# Patient Record
Sex: FEMALE | Race: BLACK OR AFRICAN AMERICAN | Employment: OTHER | URBAN - METROPOLITAN AREA
[De-identification: names, ages, dates, MRNs, and addresses within clinical notes are randomized per-mention and may not be internally consistent; named-entity substitution may affect disease eponyms.]

---

## 2017-06-15 ENCOUNTER — HOSPITAL ENCOUNTER (OUTPATIENT)
Dept: PHYSICAL THERAPY | Age: 72
Discharge: HOME | End: 2017-06-15
Payer: MEDICARE

## 2017-06-15 PROCEDURE — 97161 PT EVAL LOW COMPLEX 20 MIN: CPT

## 2017-06-15 PROCEDURE — G8988 SELF CARE GOAL STATUS: HCPCS

## 2017-06-15 PROCEDURE — G8987 SELF CARE CURRENT STATUS: HCPCS

## 2017-06-15 PROCEDURE — 97110 THERAPEUTIC EXERCISES: CPT

## 2017-06-15 NOTE — PROGRESS NOTES
PT DAILY TREATMENT NOTE - [facility]     Patient Name: Zi Baer  Date:6/15/2017  : 1945  [x]  Patient  Verified  Payor: Susanne Ee / Plan: [place] MED[place] & B / Product Type: Medicare /    In time:9:13  Out time:10:00  Total Treatment Time (min): 47  Total Timed Codes (min): 8  1:1 Treatment Time ( [address] only): 52   Visit #: 1 of 16    Treatment Area: Pain in shoulder region, left [M25.512]    SUBJECTIVE  Pain Level (0-10 scale): 0/10  Any medication changes, allergies to medications, adverse drug reactions, diagnosis change, or new procedure performed?: [x] No    [] Yes (see summary sheet for update)  Subjective functional status/changes:   [] No changes reported  Ceil Stakes in late 2017 hurting B shoulders (left> right)  Hx: left rotator cuff repair , and another time prior to   Job: retired, lives with , right handed  Goals: \"I would love to use my left arm more. \"  C/o: difficulty with pain when elevating left UE over shoulder level, denies dropping items in hand, can use UEs at shoulder level    OBJECTIVE    Modality rationale:    Min Type Additional Details    [] Estim:  []Unatt       []IFC  []Premod                        []Other:  []w/ice   []w/heat  Position:  Location:    [] Estim: []Att    []TENS instruct  []NMES                    []Other:  []w/US   []w/ice   []w/heat  Position:  Location:    []  Traction: [] Cervical       []Lumbar                       [] Prone          []Supine                       []Intermittent   []Continuous Lbs:  [] before manual  [] after manual    []  Ultrasound: []Continuous   [] Pulsed                           []1MHz   []3MHz W/cm2:  Location:    []  Iontophoresis with dexamethasone         Location: [] Take home patch   [] In clinic    []  Ice     []  heat  []  Ice massage  []  Laser   []  Anodyne Position:  Location:    []  Laser with stim  []  Other:  Position:  Location:    []  Vasopneumatic Device Pressure:       [] lo [] med [] hi Temperature: [] lo [] med [] hi   [] Skin assessment post-treatment:  []intact []redness- no adverse reaction    []redness - adverse reaction:     39 min [x]Eval                  []Re-Eval       8 min Therapeutic Exercise:  [x] See flow sheet :  Added shoulder flexion stretches at counter   Rationale: increase ROM to improve the patients ability to reach overhead. min Therapeutic Activity:  []  See flow sheet :         min Neuromuscular Re-education:  []  See flow sheet :        min Manual Therapy:          min Gait Training:  ___ feet with ___ device on level surfaces with ___ level of assist   Rationale: With   [] TE   [] TA   [] neuro   [] other: Patient Education: [x] Review HEP    [] Progressed/Changed HEP based on:   [] positioning   [] body mechanics   [] transfers   [] heat/ice application    [] other:      Other Objective/Functional Measures:   See evaluation and plan of care. Pain Level (0-10 scale) post treatment: 0/10    ASSESSMENT/Changes in Function:    Pt reports that MRI shows rotator cuff tear, but she would like to try PT to improve strength and function and avoid surgery if possible. During PT exam, pt's AROM of shoulder flexion: 126 degrees left, 141 degrees right, shoulder extension: 56 degrees left, 57 degrees right, scaption: 138 degrees left, 141 degrees right, abduction: 100 degrees left, 139 degrees right, ER: crown of head left, C2 level right, IR: L1 level left, iliac crest right; PROM of left shoulder flexion: 152 degrees left, scaption: 180 degrees, abduction: 169 degrees, ER: 90 degrees, IR: 57 degrees; strength of UEs grossly 4/5 to 5/5 bilateral UEs with pain during resisted shoulder flexion bilaterally, abduction bilaterally, and IR on left only; trigger points left neck and C/T musculature; negative impingement signs, shoulder instability , and ACJ tenderness to palpation.     Patient will continue to benefit from skilled PT services to modify and progress therapeutic interventions, address ROM deficits, address strength deficits, analyze and address soft tissue restrictions, analyze and cue movement patterns, analyze and modify body mechanics/ergonomics, assess and modify postural abnormalities and instruct in home and community integration to attain remaining goals. []  See Plan of Care  []  See progress note/recertification  []  See Discharge Summary         Progress towards goals / Updated goals:  Short Term Goals (To be completed in 4 weeks)   1) Goal: Pt will be independent and compliant with HEP to achieve other goals. Status at initial evaluation: pt is not independent with exercises  Current status: not reassessed     2) Goal: Pt's AROM/PROM of left shoulder will increase by 5-10 degrees per week in order to prepare for strengthening and improve ADLs. Status at initial evaluation: AROM of shoulder flexion: 126 degrees left, 141 degrees right, shoulder extension: 56 degrees left, 57 degrees right, scaption: 138 degrees left, 141 degrees right, abduction: 100 degrees left, 139 degrees right, ER: crown of head left, C2 level right, IR: L1 level left, iliac crest right; PROM of left shoulder flexion: 152 degrees left, scaption: 180 degrees, abduction: 169 degrees, ER: 90 degrees, IR: 57 degrees  Current status: not reassessed     3) Goal: Increase strength of left shoulder by 1/3 grade in order to allow pt to reach overhead cabinet and lift up to 3 lbs. without increased pain. Status at initial evaluation: strength of UEs grossly 4/5 to 5/5 bilateral UEs with pain during resisted shoulder flexion bilaterally, abduction bilaterally, and IR on left only  Current status: not reassessed     4) Decrease pain in left shoulder to 4/10 at worst during ADLs in order to allow pt to sleep at least 5 hours straight.   Status at initial evaluation: pain 7/10 at worst  Current status: not reassessed    Long Term Goals (To be completed in 8 weeks)   1) Goal: Increase AROM/PROM of left shoulder to >90% of normal in all directions without increased pain in order to return to normal function. Status at initial evaluation:  AROM of shoulder flexion: 126 degrees left, 141 degrees right, shoulder extension: 56 degrees left, 57 degrees right, scaption: 138 degrees left, 141 degrees right, abduction: 100 degrees left, 139 degrees right, ER: crown of head left, C2 level right, IR: L1 level left, iliac crest right  Current status: not reassessed     2) Goal: Increase strength of left shoulder to 5/5 all muscle groups without increased pain in order to return to normal function. Status at initial evaluation: strength of UEs grossly 4/5 to 5/5 bilateral UEs with pain during resisted shoulder flexion bilaterally, abduction bilaterally, and IR on left only  Current status: not reassessed     3) Goal: Decrease pain in left shoulder to 2/10 at worst during ADLs in order to allow pt to sleep at least 8 hours straight and normalize function.   Status at initial evaluation: pain 7/10 at worst  Current status: not reassessed    PLAN  []  Upgrade activities as tolerated     []  Continue plan of care  []  Update interventions per flow sheet       []  Discharge due to:_  [x]  Other: trigger point release, UE strengthening, shoulder ROM/stretches, UE stabilizatiuon      Vasile Fernandez, PT 6/15/2017  3:29 PM    Future Appointments  Date Time Provider Jan Gaines   6/20/2017 2:00 PM Edwige Lennon [place] ANANYA [facility]   6/22/2017 9:00 AM ISI Jones [facility]   6/27/2017 2:00 PM ISI Jones [facility]   6/30/2017 10:00 AM Gil Quevedo, ISI SANTOSHPNATHAN [facility]

## 2017-06-15 NOTE — PROGRESS NOTES
[facility] in [address] MAUREEN Barahona [place] [address] [address]  Phone: [phone] Fax: [phone] of Care/ Statement of Necessity for Physical Therapy Services    Patient name: Oniel Bravo Start of Care: 6/15/2017   Referral source: Dannielle Stevenson MD : 1945    Medical Diagnosis: Pain in shoulder region, left [M25.512], left rotator cuff tear Onset Date: late 2017    Treatment Diagnosis: decreased ROM, decreased function, decreased UE strength, muscular dysfunction   Prior Hospitalization: see medical history Provider#: [id number]   Medications: Verified on Patient summary List    Comorbidities: DM, HTN, thyroid problems, CA   Prior Level of Function: no deficits      The Plan of Care and following information is based on the information from the initial evaluation. Assessment/ key information:    Pt reports that MRI shows rotator cuff tear, but she would like to try PT to improve strength and function and avoid surgery if possible. During PT exam, pt's AROM of shoulder flexion: 126 degrees left, 141 degrees right, shoulder extension: 56 degrees left, 57 degrees right, scaption: 138 degrees left, 141 degrees right, abduction: 100 degrees left, 139 degrees right, ER: crown of head left, C2 level right, IR: L1 level left, iliac crest right; PROM of left shoulder flexion: 152 degrees left, scaption: 180 degrees, abduction: 169 degrees, ER: 90 degrees, IR: 57 degrees; strength of UEs grossly 4/5 to 5/5 bilateral UEs with pain during resisted shoulder flexion bilaterally, abduction bilaterally, and IR on left only; trigger points left neck and C/T musculature; negative impingement signs, shoulder instability , and ACJ tenderness to palpation.     Evaluation Complexity History MEDIUM  Complexity : 1-2 comorbidities / personal factors will impact the outcome/ POC ; Examination MEDIUM Complexity : 3 Standardized tests and measures addressing body structure, function, activity limitation and / or participation in recreation  ;Presentation LOW Complexity : Stable, uncomplicated  ;Clinical Decision Making MEDIUM Complexity : FOTO score of 26-74  Overall Complexity Rating: LOW   Problem List: pain affecting function, decrease ROM, decrease strength, decrease ADL/ functional abilitiies, decrease activity tolerance and decrease flexibility/ joint mobility   Treatment Plan may include any combination of the following: Therapeutic exercise, Therapeutic activities, Neuromuscular re-education, Physical agent/modality, Manual therapy and Patient education  Patient / Family readiness to learn indicated by: asking questions, trying to perform skills and interest  Persons(s) to be included in education: patient (P)  Barriers to Learning/Limitations: None  Patient Goal (s): \"I would love to use my left arm more. \"  Patient Self Reported Health Status: good  Rehabilitation Potential: good    Short Term Goals (To be completed in 4 weeks)   1) Goal: Pt will be independent and compliant with HEP to achieve other goals. Status at initial evaluation: pt is not independent with exercises   2) Goal: Pt's AROM/PROM of left shoulder will increase by 5-10 degrees per week in order to prepare for strengthening and improve ADLs. Status at initial evaluation: AROM of shoulder flexion: 126 degrees left, 141 degrees right, shoulder extension: 56 degrees left, 57 degrees right, scaption: 138 degrees left, 141 degrees right, abduction: 100 degrees left, 139 degrees right, ER: crown of head left, C2 level right, IR: L1 level left, iliac crest right; PROM of left shoulder flexion: 152 degrees left, scaption: 180 degrees, abduction: 169 degrees, ER: 90 degrees, IR: 57 degrees   3) Goal: Increase strength of left shoulder by 1/3 grade in order to allow pt to reach overhead cabinet and lift up to 3 lbs. without increased pain.   Status at initial evaluation: strength of UEs grossly 4/5 to 5/5 bilateral UEs with pain during resisted shoulder flexion bilaterally, abduction bilaterally, and IR on left only   4) Decrease pain in left shoulder to 4/10 at worst during ADLs in order to allow pt to sleep at least 5 hours straight. Status at initial evaluation: pain 7/10 at worst    Long Term Goals (To be completed in 8 weeks)   1) Goal: Increase AROM/PROM of left shoulder to >90% of normal in all directions without increased pain in order to return to normal function. Status at initial evaluation:  AROM of shoulder flexion: 126 degrees left, 141 degrees right, shoulder extension: 56 degrees left, 57 degrees right, scaption: 138 degrees left, 141 degrees right, abduction: 100 degrees left, 139 degrees right, ER: crown of head left, C2 level right, IR: L1 level left, iliac crest right   2) Goal: Increase strength of left shoulder to 5/5 all muscle groups without increased pain in order to return to normal function. Status at initial evaluation: strength of UEs grossly 4/5 to 5/5 bilateral UEs with pain during resisted shoulder flexion bilaterally, abduction bilaterally, and IR on left only   3) Goal: Decrease pain in left shoulder to 2/10 at worst during ADLs in order to allow pt to sleep at least 8 hours straight and normalize function. Status at initial evaluation: pain 7/10 at worst    Frequency / Duration: Patient to be seen 2 times per week for 8 weeks. Patient/ Caregiver education and instruction: Diagnosis, prognosis, self care, activity modification and exercises, plan of care   [x]  Plan of care has been reviewed with JENNIFER Parisi, PT 6/15/2017 3:28 PM    G-Codes (GP)  Self Care   Current  CK= 40-59%   Goal  CJ= 20-39%    The severity rating is based on clinical judgment and the FOTO score. Certification Period: 6/15/17 to 8/13/17    ________________________________________________________________________    I certify that the above Therapy Services are being furnished while the patient is under my care.  I agree with the treatment plan and certify that this therapy is necessary. [de-identified] Signature:____________________  Date:____________Time: _________    Please sign and return to [facility] at [facility].  Pearson Blaise [address]  Phone: [phone] Fax: [phone]

## 2017-06-15 NOTE — PROGRESS NOTES
Physical Therapy Evaluation - Shoulder  Lashell Hanks in late Jan 2017 hurting B shoulders (left> right)  Hx: left rotator cuff repair 2004, and another time prior to 2004  Job: retired, lives with , right handed  Goals: \"I would love to use my left arm more. \"  C/o: difficulty with pain when elevating left UE over shoulder level, denies dropping items in hand, can use UEs at shoulder level      Posture: [] Poor    [] Fair    [] Good    Describe:    ROM:  [] Unable to assess at this time                                           AROM                                                              PROM   Left Right  Left Right   Flexion 126 141 Flexion 152    Extension 56 57 Extension     Scaption//100 141/139 Scaption//169    ER @ 0 Degrees   ER @ 0 Degrees     ER @ 90 Degrees crown of head C2 level ER @ 90 Degrees 90    IR @ 90 Degrees L1 level Iliac crest IR @ 90 Degrees 57 pain and restriction      End Feel / Painful Arc:    Strength:   [] Unable to assess at this time                                                                            L (1-5) R (1-5) Pain   Flexors  extensors 4 mild pain  5 4+ pain  5 [x] Yes   [] No   Abductors 4+ pain 4 pain [x] Yes   [] No   External Rotators 4 4 [] Yes   [] No   Internal Rotators 5 pain 5 [x] Yes   [] No   Supraspinatus   [] Yes   [] No   Serratus Anterior   [] Yes   [] No   Lower Trapezius   [] Yes   [] No   Elbow Flexion 5 5 [] Yes   [] No   Elbow Extension 5 5 [] Yes   [] No       Scapulohumoral Control / Rhythm:  Able to eccentrically lower with good control?  Left: [] Yes   [] No     Right: [] Yes   [] No    Accessory Motions:    Palpation  [] Min  [] Mod  [] Severe    Location: trigger points left neck paraspinals, left UT. left levator scapula  [] Min  [] Mod  [] Severe    Location:  [] Min  [] Mod  [] Severe    Location:    Optional Tests:    Sensation Left Right Reflexes Left Right   Biceps (C5)   Biceps (C5)     Urias Radial(C6-7)   Brachioradialis (C6)     Urias Ulnar(C8-T1)   Triceps (C7)       Adson's Test  [] Pos   [] Neg Yergason's Test [] Pos   [] Neg  Linda's Test  [] Pos   [] Neg Assumption's Sign [] Pos   [] Neg  Neer's Test  [] Pos   [x] Neg Clunk Test  [] Pos   [] Neg  Hawkin's Test  [] Pos   [x] Neg AC Joint  [] Pos   [x] Neg  Speed's Test  [] Pos   [] Neg SC Joint  [] Pos   [] Neg  Empty Can  [] Pos   [] Neg Pectoral Tightness [] Pos   [] Neg  Anterior Apprehension [] Pos   [x] Neg   Posterior Apprehension [] Pos   [x] Neg      Other Tests / Comments:

## 2017-06-20 ENCOUNTER — HOSPITAL ENCOUNTER (OUTPATIENT)
Dept: PHYSICAL THERAPY | Age: 72
Discharge: HOME | End: 2017-06-20
Payer: MEDICARE

## 2017-06-20 PROCEDURE — 97110 THERAPEUTIC EXERCISES: CPT

## 2017-06-20 PROCEDURE — 97140 MANUAL THERAPY 1/> REGIONS: CPT

## 2017-06-20 NOTE — PROGRESS NOTES
PT DAILY TREATMENT NOTE - [facility]     Patient Name: Ludivina Lockett  Date:2017  : 1945  [x]  Patient  Verified  Payor: Caroline Cody / Plan: [place] MEDICARE PART A & B / Product Type: Medicare /    In time:210  Out time:250  Total Treatment Time (min): 40  Total Timed Codes (min):40  1:1 Treatment Time ( [address] only): 30   Visit #: 2 of 16    Treatment Area: Pain in shoulder region, left [M25.512]    SUBJECTIVE  Pain Level (0-10 scale): 2-3/10  Any medication changes, allergies to medications, adverse drug reactions, diagnosis change, or new procedure performed?: [x] No    [] Yes (see summary sheet for update)  Subjective functional status/changes:   [] No changes reported  Candice Nenitarachael in late 2017 hurting B shoulders (left> right)  Hx: left rotator cuff repair , and another time prior to   Job: retired, lives with , right handed  Goals: \"I would love to use my left arm more. \"  Reports minimal soreness after first visit, able to sleep on left shoulder    OBJECTIVE    Modality rationale:    Min Type Additional Details    [] Estim:  []Unatt       []IFC  []Premod                        []Other:  []w/ice   []w/heat  Position:  Location:    [] Estim: []Att    []TENS instruct  []NMES                    []Other:  []w/US   []w/ice   []w/heat  Position:  Location:    []  Traction: [] Cervical       []Lumbar                       [] Prone          []Supine                       []Intermittent   []Continuous Lbs:  [] before manual  [] after manual    []  Ultrasound: []Continuous   [] Pulsed                           []1MHz   []3MHz W/cm2:  Location:    []  Iontophoresis with dexamethasone         Location: [] Take home patch   [] In clinic    []  Ice     []  heat  []  Ice massage  []  Laser   []  Anodyne Position:  Location:    []  Laser with stim  []  Other:  Position:  Location:    []  Vasopneumatic Device Pressure:       [] lo [] med [] hi   Temperature: [] lo [] med [] hi   [] Skin assessment post-treatment: []intact []redness- no adverse reaction    []redness - adverse reaction:      min []Eval                  []Re-Eval       20 min Therapeutic Exercise:  [x] See flow sheet :  Added shoulder flexion stretches at counter   Rationale: increase ROM to improve the patients ability to reach overhead. min Therapeutic Activity:  []  See flow sheet :         min Neuromuscular Re-education:  []  See flow sheet :       20 min Manual Therapy:assisted left shoulder ROM, functional massage at end range in supine with gentle GHJ caudal glides, functional massage in SL for end range mobility and scapula mobility, application of Kinesiotape left RC for increased stability         min Gait Training:  ___ feet with ___ device on level surfaces with ___ level of assist   Rationale: With   [] TE   [] TA   [] neuro   [] other: Patient Education: [x] Review HEP    [] Progressed/Changed HEP based on:   [] positioning   [] body mechanics   [] transfers   [] heat/ice application    [] other:      Other Objective/Functional Measures:   Supine left shoulder Flex 155, seated 140     Pain Level (0-10 scale) post treatment: 0/10    ASSESSMENT/Changes in Function:    Pt reports that MRI shows rotator cuff tear, but she would like to try PT to improve strength and function and avoid surgery if possible.   During PT exam, pt's AROM of shoulder flexion: 126 degrees left, 141 degrees right, shoulder extension: 56 degrees left, 57 degrees right, scaption: 138 degrees left, 141 degrees right, abduction: 100 degrees left, 139 degrees right, ER: crown of head left, C2 level right, IR: L1 level left, iliac crest right; PROM of left shoulder flexion: 152 degrees left, scaption: 180 degrees, abduction: 169 degrees, ER: 90 degrees, IR: 57 degrees; strength of UEs grossly 4/5 to 5/5 bilateral UEs with pain during resisted shoulder flexion bilaterally, abduction bilaterally, and IR on left only; trigger points left neck and C/T musculature; negative impingement signs, shoulder instability , and ACJ tenderness to palpation. Patient will continue to benefit from skilled PT services to modify and progress therapeutic interventions, address ROM deficits, address strength deficits, analyze and address soft tissue restrictions, analyze and cue movement patterns, analyze and modify body mechanics/ergonomics, assess and modify postural abnormalities and instruct in home and community integration to attain remaining goals. [x]  See Plan of Care  []  See progress note/recertification  []  See Discharge Summary         Progress towards goals / Updated goals:  Short Term Goals (To be completed in 4 weeks)   1) Goal: Pt will be independent and compliant with HEP to achieve other goals. Status at initial evaluation: pt is not independent with exercises  Current status:compliant with HEP     2) Goal: Pt's AROM/PROM of left shoulder will increase by 5-10 degrees per week in order to prepare for strengthening and improve ADLs. Status at initial evaluation: AROM of shoulder flexion: 126 degrees left, 141 degrees right, shoulder extension: 56 degrees left, 57 degrees right, scaption: 138 degrees left, 141 degrees right, abduction: 100 degrees left, 139 degrees right, ER: crown of head left, C2 level right, IR: L1 level left, iliac crest right; PROM of left shoulder flexion: 152 degrees left, scaption: 180 degrees, abduction: 169 degrees, ER: 90 degrees, IR: 57 degrees  Current status: Supine shoulder Flex 155, seated 140, progressing     3) Goal: Increase strength of left shoulder by 1/3 grade in order to allow pt to reach overhead cabinet and lift up to 3 lbs. without increased pain.   Status at initial evaluation: strength of UEs grossly 4/5 to 5/5 bilateral UEs with pain during resisted shoulder flexion bilaterally, abduction bilaterally, and IR on left only  Current status: not reassessed     4) Decrease pain in left shoulder to 4/10 at worst during ADLs in order to allow pt to sleep at least 5 hours straight. Status at initial evaluation: pain 7/10 at worst  Current status: not reassessed    Long Term Goals (To be completed in 8 weeks)   1) Goal: Increase AROM/PROM of left shoulder to >90% of normal in all directions without increased pain in order to return to normal function. Status at initial evaluation:  AROM of shoulder flexion: 126 degrees left, 141 degrees right, shoulder extension: 56 degrees left, 57 degrees right, scaption: 138 degrees left, 141 degrees right, abduction: 100 degrees left, 139 degrees right, ER: crown of head left, C2 level right, IR: L1 level left, iliac crest right  Current status: not reassessed     2) Goal: Increase strength of left shoulder to 5/5 all muscle groups without increased pain in order to return to normal function. Status at initial evaluation: strength of UEs grossly 4/5 to 5/5 bilateral UEs with pain during resisted shoulder flexion bilaterally, abduction bilaterally, and IR on left only  Current status: not reassessed     3) Goal: Decrease pain in left shoulder to 2/10 at worst during ADLs in order to allow pt to sleep at least 8 hours straight and normalize function.   Status at initial evaluation: pain 7/10 at worst  Current status: not reassessed    PLAN  []  Upgrade activities as tolerated     []  Continue plan of care  []  Update interventions per flow sheet       []  Discharge due to:_  [x]  Other: trigger point release, UE strengthening, shoulder ROM/stretches, UE stabilizatiuon      Angi Castro, PT 6/20/2017  3:29 PM    Future Appointments  Date Time Provider Jan Gaines   6/22/2017 9:00 AM ISI Bullock [facility]   6/27/2017 2:00 PM ISI Bullock [facility]   6/30/2017 10:00 AM ISI Watson [facility]

## 2017-06-22 ENCOUNTER — HOSPITAL ENCOUNTER (OUTPATIENT)
Dept: PHYSICAL THERAPY | Age: 72
Discharge: HOME | End: 2017-06-22
Payer: MEDICARE

## 2017-06-22 PROCEDURE — 97110 THERAPEUTIC EXERCISES: CPT

## 2017-06-22 NOTE — PROGRESS NOTES
PT DAILY TREATMENT NOTE - [facility]     Patient Name: Esteban Valdovinos  Date:2017  : 1945  [x]  Patient  Verified  Payor: [place] MEDICARE / Plan: [place] MEDICARE PART A & B / Product Type: Medicare /    In time:9:05  Out time:9:45  Total Treatment Time (min): 40  Total Timed Codes (min): 40  1:1 Treatment Time ( [address] only): 40   Visit #: 3 of 16    Treatment Area: Pain in shoulder region, left [M25.512]    SUBJECTIVE  Pain Level (0-10 scale): 0/10  Any medication changes, allergies to medications, adverse drug reactions, diagnosis change, or new procedure performed?: [x] No    [] Yes (see summary sheet for update)  Subjective functional status/changes:   [] No changes reported  \"The tape has really helped. \"    OBJECTIVE    Modality rationale:    Min Type Additional Details    [] Estim:  []Unatt       []IFC  []Premod                        []Other:  []w/ice   []w/heat  Position:  Location:    [] Estim: []Att    []TENS instruct  []NMES                    []Other:  []w/US   []w/ice   []w/heat  Position:  Location:    []  Traction: [] Cervical       []Lumbar                       [] Prone          []Supine                       []Intermittent   []Continuous Lbs:  [] before manual  [] after manual    []  Ultrasound: []Continuous   [] Pulsed                           []1MHz   []3MHz W/cm2:  Location:    []  Iontophoresis with dexamethasone         Location: [] Take home patch   [] In clinic    []  Ice     []  heat  []  Ice massage  []  Laser   []  Anodyne Position:  Location:    []  Laser with stim  []  Other:  Position:  Location:    []  Vasopneumatic Device Pressure:       [] lo [] med [] hi   Temperature: [] lo [] med [] hi   [] Skin assessment post-treatment:  []intact []redness- no adverse reaction    []redness - adverse reaction:      min []Eval                  []Re-Eval       40 min Therapeutic Exercise:  [x] See flow sheet :  Added IR stretches with strap, added ER stretches at doorway (0 degrees shoulder abduction)   Rationale: increase ROM, increase strength, improve coordination and increase proprioception to improve the patients ability to normalize reaching and ADLs. min Therapeutic Activity:  []  See flow sheet :         min Neuromuscular Re-education:  []  See flow sheet :        min Manual Therapy:          min Gait Training:  ___ feet with ___ device on level surfaces with ___ level of assist   Rationale: With   [] TE   [] TA   [] neuro   [] other: Patient Education: [x] Review HEP    [] Progressed/Changed HEP based on:   [] positioning   [] body mechanics   [] transfers   [] heat/ice application    [] other:      Other Objective/Functional Measures:   No objective measures taken today. Pain Level (0-10 scale) post treatment: 0/10    ASSESSMENT/Changes in Function:    No aggravation of pain during any of the exercises. Patient will continue to benefit from skilled PT services to modify and progress therapeutic interventions, address ROM deficits, address strength deficits, analyze and address soft tissue restrictions, analyze and cue movement patterns, analyze and modify body mechanics/ergonomics, assess and modify postural abnormalities and instruct in home and community integration to attain remaining goals.      [x]  See Plan of Care  []  See progress note/recertification  []  See Discharge Summary      Progress towards goals / Updated goals:  Short Term Goals (To be completed in 4 weeks)                        1) Goal: Pt will be independent and compliant with HEP to achieve other goals. Status at initial evaluation: pt is not independent with exercises  Current status:compliant with HEP                           2) Goal: Pt's AROM/PROM of left shoulder will increase by 5-10 degrees per week in order to prepare for strengthening and improve ADLs.   Status at initial evaluation: AROM of shoulder flexion: 126 degrees left, 141 degrees right, shoulder extension: 56 degrees left, 57 degrees right, scaption: 138 degrees left, 141 degrees right, abduction: 100 degrees left, 139 degrees right, ER: crown of head left, C2 level right, IR: L1 level left, iliac crest right; PROM of left shoulder flexion: 152 degrees left, scaption: 180 degrees, abduction: 169 degrees, ER: 90 degrees, IR: 57 degrees  Current status: Supine shoulder Flex 155, seated 140, progressing                           3) Goal: Increase strength of left shoulder by 1/3 grade in order to allow pt to reach overhead cabinet and lift up to 3 lbs. without increased pain. Status at initial evaluation: strength of UEs grossly 4/5 to 5/5 bilateral UEs with pain during resisted shoulder flexion bilaterally, abduction bilaterally, and IR on left only  Current status: not reassessed                           4) Decrease pain in left shoulder to 4/10 at worst during ADLs in order to allow pt to sleep at least 5 hours straight. Status at initial evaluation: pain 7/10 at worst  Current status: not reassessed     Long Term Goals (To be completed in 8 weeks)                        1) Goal: Increase AROM/PROM of left shoulder to >90% of normal in all directions without increased pain in order to return to normal function. Status at initial evaluation:  AROM of shoulder flexion: 126 degrees left, 141 degrees right, shoulder extension: 56 degrees left, 57 degrees right, scaption: 138 degrees left, 141 degrees right, abduction: 100 degrees left, 139 degrees right, ER: crown of head left, C2 level right, IR: L1 level left, iliac crest right  Current status: not reassessed                           2) Goal: Increase strength of left shoulder to 5/5 all muscle groups without increased pain in order to return to normal function.   Status at initial evaluation: strength of UEs grossly 4/5 to 5/5 bilateral UEs with pain during resisted shoulder flexion bilaterally, abduction bilaterally, and IR on left only  Current status: not reassessed    3) Goal: Decrease pain in left shoulder to 2/10 at worst during ADLs in order to allow pt to sleep at least 8 hours straight and normalize function.   Status at initial evaluation: pain 7/10 at worst  Current status: not reassessed     PLAN  []  Upgrade activities as tolerated     [x]  Continue plan of care  []  Update interventions per flow sheet       []  Discharge due to:_  []  Other:_      Anuj Bhat PT 6/22/2017  9:28 AM    Future Appointments  Date Time Provider Jan Gaines   6/27/2017 2:00 PM Anuj Bhat PT [facility]   6/30/2017 10:00 AM ISI Bradley [facility]

## 2017-06-27 ENCOUNTER — HOSPITAL ENCOUNTER (OUTPATIENT)
Dept: PHYSICAL THERAPY | Age: 72
Discharge: HOME | End: 2017-06-27
Payer: MEDICARE

## 2017-06-27 PROCEDURE — 97110 THERAPEUTIC EXERCISES: CPT

## 2017-06-27 NOTE — PROGRESS NOTES
PT DAILY TREATMENT NOTE - [facility]     Patient Name: Celestine Stroud  Date:2017  : 1945  [x]  Patient  Verified  Payor: [place] MEDICARE / Plan: [place] MEDICARE PART A & B / Product Type: Medicare /    In time:2:05  Out time:2:45  Total Treatment Time (min): 40  Total Timed Codes (min): 40  1:1 Treatment Time ( [address] only): 10   Visit #: 4 of 16    Treatment Area: Pain in shoulder region, left [M25.512]    SUBJECTIVE  Pain Level (0-10 scale): 0/10  Any medication changes, allergies to medications, adverse drug reactions, diagnosis change, or new procedure performed?: [x] No    [] Yes (see summary sheet for update)  Subjective functional status/changes:   [] No changes reported  \"Pain 3/10 at worst.\"    OBJECTIVE    Modality rationale:    Min Type Additional Details    [] Estim:  []Unatt       []IFC  []Premod                        []Other:  []w/ice   []w/heat  Position:  Location:    [] Estim: []Att    []TENS instruct  []NMES                    []Other:  []w/US   []w/ice   []w/heat  Position:  Location:    []  Traction: [] Cervical       []Lumbar                       [] Prone          []Supine                       []Intermittent   []Continuous Lbs:  [] before manual  [] after manual    []  Ultrasound: []Continuous   [] Pulsed                           []1MHz   []3MHz W/cm2:  Location:    []  Iontophoresis with dexamethasone         Location: [] Take home patch   [] In clinic    []  Ice     []  heat  []  Ice massage  []  Laser   []  Anodyne Position:  Location:    []  Laser with stim  []  Other:  Position:  Location:    []  Vasopneumatic Device Pressure:       [] lo [] med [] hi   Temperature: [] lo [] med [] hi   [] Skin assessment post-treatment:  []intact []redness- no adverse reaction    []redness - adverse reaction:      min []Eval                  []Re-Eval       40 total  10 1:1 min Therapeutic Exercise:  [x] See flow sheet :  Increased resistance of rows/shoulder extensions/ER/IR to green theraband, added body blade (0 degrees shoulder abduction, 90 degrees elbow flexion)   Rationale: increase ROM, increase strength, improve coordination and increase proprioception to improve the patients ability to normalize reaching and ADLs. min Therapeutic Activity:  []  See flow sheet :         min Neuromuscular Re-education:  []  See flow sheet :        min Manual Therapy:          min Gait Training:  ___ feet with ___ device on level surfaces with ___ level of assist   Rationale: With   [] TE   [] TA   [] neuro   [] other: Patient Education: [x] Review HEP    [] Progressed/Changed HEP based on:   [] positioning   [] body mechanics   [] transfers   [] heat/ice application    [] other:      Other Objective/Functional Measures:   No objective measures taken today. Pain Level (0-10 scale) post treatment: 0/10    ASSESSMENT/Changes in Function:    Pt continues to report improvement of shoulder pain and function. No aggravation of pain  After progression of intensity of exercises today. Patient will continue to benefit from skilled PT services to modify and progress therapeutic interventions, address ROM deficits, address strength deficits, analyze and address soft tissue restrictions, analyze and cue movement patterns, analyze and modify body mechanics/ergonomics, assess and modify postural abnormalities and instruct in home and community integration to attain remaining goals.      [x]  See Plan of Care  []  See progress note/recertification  []  See Discharge Summary      Progress towards goals / Updated goals:  Short Term Goals (To be completed in 4 weeks)                        1) Goal: Pt will be independent and compliant with HEP to achieve other goals.   Status at initial evaluation: pt is not independent with exercises  Current status:compliant with HEP                            2) Goal: Pt's AROM/PROM of left shoulder will increase by 5-10 degrees per week in order to prepare for strengthening and improve ADLs. Status at initial evaluation: AROM of shoulder flexion: 126 degrees left, 141 degrees right, shoulder extension: 56 degrees left, 57 degrees right, scaption: 138 degrees left, 141 degrees right, abduction: 100 degrees left, 139 degrees right, ER: crown of head left, C2 level right, IR: L1 level left, iliac crest right; PROM of left shoulder flexion: 152 degrees left, scaption: 180 degrees, abduction: 169 degrees, ER: 90 degrees, IR: 57 degrees  Current status: Supine shoulder Flex 155, seated 140, progressing                            3) Goal: Increase strength of left shoulder by 1/3 grade in order to allow pt to reach overhead cabinet and lift up to 3 lbs. without increased pain. Status at initial evaluation: strength of UEs grossly 4/5 to 5/5 bilateral UEs with pain during resisted shoulder flexion bilaterally, abduction bilaterally, and IR on left only  Current status: not reassessed                            4) Decrease pain in left shoulder to 4/10 at worst during ADLs in order to allow pt to sleep at least 5 hours straight. Status at initial evaluation: pain 7/10 at worst  Current status: not reassessed      Long Term Goals (To be completed in 8 weeks)                        4) Goal: Increase AROM/PROM of left shoulder to >90% of normal in all directions without increased pain in order to return to normal function. Status at initial evaluation:  AROM of shoulder flexion: 126 degrees left, 141 degrees right, shoulder extension: 56 degrees left, 57 degrees right, scaption: 138 degrees left, 141 degrees right, abduction: 100 degrees left, 139 degrees right, ER: crown of head left, C2 level right, IR: L1 level left, iliac crest right  Current status: not reassessed                            2) Goal: Increase strength of left shoulder to 5/5 all muscle groups without increased pain in order to return to normal function.   Status at initial evaluation: strength of UEs grossly 4/5 to 5/5 bilateral UEs with pain during resisted shoulder flexion bilaterally, abduction bilaterally, and IR on left only  Current status: not reassessed                            3) Goal: Decrease pain in left shoulder to 2/10 at worst during ADLs in order to allow pt to sleep at least 8 hours straight and normalize function.   Status at initial evaluation: pain 7/10 at worst  Current status: not reassessed    PLAN  [x]  Upgrade activities as tolerated     [x]  Continue plan of care  []  Update interventions per flow sheet       []  Discharge due to:_  []  Other:_      Chriss Cardenas PT 6/27/2017  3:43 PM    Future Appointments  Date Time Provider Jna Gaines   6/29/2017 8:30 AM Justyna Bautista, PT [facility]   7/3/2017 2:30 PM Justyna Bautista PT [facility]   7/6/2017 3:00 PM Justyna Bautista PT [facility]   7/12/2017 3:30 PM Marion Gutierrez PT [facility]   7/14/2017 12:30 PM [facility]   7/18/2017 2:30 PM Chriss Cardenas PT [facility]   7/20/2017 2:30 PM Chriss Cardenas PT [facility]   7/25/2017 2:30 PM ISI [facility]   7/27/2017 2:30 PM Marion Gutierrez PT [facility]

## 2017-06-29 ENCOUNTER — HOSPITAL ENCOUNTER (OUTPATIENT)
Dept: PHYSICAL THERAPY | Age: 72
Discharge: HOME | End: 2017-06-29
Payer: MEDICARE

## 2017-06-29 PROCEDURE — 97110 THERAPEUTIC EXERCISES: CPT

## 2017-06-29 PROCEDURE — 97140 MANUAL THERAPY 1/> REGIONS: CPT

## 2017-06-29 NOTE — PROGRESS NOTES
PT DAILY TREATMENT NOTE - [facility]     Patient Name: Lamine Benoit  Date:2017  : 1945  [x]  Patient  Verified  Payor: [place] MEDICARE / Plan: [place] MEDICARE PART A & B / Product Type: Medicare /    In time:845  Out time:925  Total Treatment Time (min): 40  Total Timed Codes (min): 40  1:1 Treatment Time ( only): 30   Visit #: 5 of 16    Treatment Area: Pain in shoulder region, left [M25.512]    SUBJECTIVE  Pain Level (0-10 scale): 0/10  Any medication changes, allergies to medications, adverse drug reactions, diagnosis change, or new procedure performed?: [x] No    [] Yes (see summary sheet for update)  Subjective functional status/changes:   [] No changes reported  \"Both of my shoulders are hurting some times but not at present\"    OBJECTIVE    Modality rationale:    Min Type Additional Details    [] Estim:  []Unatt       []IFC  []Premod                        []Other:  []w/ice   []w/heat  Position:  Location:    [] Estim: []Att    []TENS instruct  []NMES                    []Other:  []w/US   []w/ice   []w/heat  Position:  Location:    []  Traction: [] Cervical       []Lumbar                       [] Prone          []Supine                       []Intermittent   []Continuous Lbs:  [] before manual  [] after manual    []  Ultrasound: []Continuous   [] Pulsed                           []1MHz   []3MHz W/cm2:  Location:    []  Iontophoresis with dexamethasone         Location: [] Take home patch   [] In clinic    []  Ice     []  heat  []  Ice massage  []  Laser   []  Anodyne Position:  Location:    []  Laser with stim  []  Other:  Position:  Location:    []  Vasopneumatic Device Pressure:       [] lo [] med [] hi   Temperature: [] lo [] med [] hi   [] Skin assessment post-treatment:  []intact []redness- no adverse reaction    []redness - adverse reaction:      min []Eval                  []Re-Eval       30 min Therapeutic Exercise:  [x] See flow sheet :instruction in TB ex     Rationale: increase ROM, increase strength, improve coordination and increase proprioception to improve the patients ability to normalize reaching and ADLs. min Therapeutic Activity:  []  See flow sheet :         min Neuromuscular Re-education:  []  See flow sheet :       10 min Manual Therapy: functional massage left scapular region and GHJ distraction at end range Flex for pain control        min Gait Training:  ___ feet with ___ device on level surfaces with ___ level of assist   Rationale: With   [] TE   [] TA   [] neuro   [] other: Patient Education: [x] Review HEP    [] Progressed/Changed HEP based on:   [] positioning   [] body mechanics   [] transfers   [] heat/ice application    [] other:      Other Objective/Functional Measures:   Bilateral shoulder mobility limited in Flex     Pain Level (0-10 scale) post treatment: 0/10    ASSESSMENT/Changes in Function:    Pt continues to report improvement of shoulder pain and function. No aggravation of pain  After progression of intensity of exercises today. Patient will continue to benefit from skilled PT services to modify and progress therapeutic interventions, address ROM deficits, address strength deficits, analyze and address soft tissue restrictions, analyze and cue movement patterns, analyze and modify body mechanics/ergonomics, assess and modify postural abnormalities and instruct in home and community integration to attain remaining goals.      [x]  See Plan of Care  []  See progress note/recertification  []  See Discharge Summary      Progress towards goals / Updated goals:  Short Term Goals (To be completed in 4 weeks)                        1) Goal: Pt will be independent and compliant with HEP to achieve other goals.   Status at initial evaluation: pt is not independent with exercises  Current status:compliant with HEP                            2) Goal: Pt's AROM/PROM of left shoulder will increase by 5-10 degrees per week in order to prepare for strengthening and improve ADLs. Status at initial evaluation: AROM of shoulder flexion: 126 degrees left, 141 degrees right, shoulder extension: 56 degrees left, 57 degrees right, scaption: 138 degrees left, 141 degrees right, abduction: 100 degrees left, 139 degrees right, ER: crown of head left, C2 level right, IR: L1 level left, iliac crest right; PROM of left shoulder flexion: 152 degrees left, scaption: 180 degrees, abduction: 169 degrees, ER: 90 degrees, IR: 57 degrees  Current status: Supine shoulder Flex 155, seated 140, progressing                            3) Goal: Increase strength of left shoulder by 1/3 grade in order to allow pt to reach overhead cabinet and lift up to 3 lbs. without increased pain. Status at initial evaluation: strength of UEs grossly 4/5 to 5/5 bilateral UEs with pain during resisted shoulder flexion bilaterally, abduction bilaterally, and IR on left only  Current status: not reassessed                            4) Decrease pain in left shoulder to 4/10 at worst during ADLs in order to allow pt to sleep at least 5 hours straight. Status at initial evaluation: pain 7/10 at worst  Current status: not reassessed      Long Term Goals (To be completed in 8 weeks)                        3) Goal: Increase AROM/PROM of left shoulder to >90% of normal in all directions without increased pain in order to return to normal function. Status at initial evaluation:  AROM of shoulder flexion: 126 degrees left, 141 degrees right, shoulder extension: 56 degrees left, 57 degrees right, scaption: 138 degrees left, 141 degrees right, abduction: 100 degrees left, 139 degrees right, ER: crown of head left, C2 level right, IR: L1 level left, iliac crest right  Current status: not reassessed                            2) Goal: Increase strength of left shoulder to 5/5 all muscle groups without increased pain in order to return to normal function.   Status at initial evaluation: strength of UEs grossly 4/5 to 5/5 bilateral UEs with pain during resisted shoulder flexion bilaterally, abduction bilaterally, and IR on left only  Current status: not reassessed                            3) Goal: Decrease pain in left shoulder to 2/10 at worst during ADLs in order to allow pt to sleep at least 8 hours straight and normalize function.   Status at initial evaluation: pain 7/10 at worst  Current status: not reassessed    PLAN  [x]  Upgrade activities as tolerated     [x]  Continue plan of care  []  Update interventions per flow sheet       []  Discharge due to:_  []  Other:_      Alex Paredes PT 6/29/2017  3:43 PM    Future Appointments  Date Time Provider Jan Gaines   7/3/2017 2:30 PM Alex Paredes PT [facility]   7/6/2017 3:00 PM Marion Pinedo PT [facility]   7/12/2017 3:30 PM Marion Bradshaw [facility]   7/14/2017 12:30 PM [facility]   7/18/2017 2:30 PM Abdiel Gutierrez PT [facility]   7/20/2017 2:30 PM Abdiel Gutierrez PT [facility]   7/25/2017 2:30 PM Abdiel Gutierrez PT [facility]   7/27/2017 2:30 PM Marion Pinedo PT [facility]

## 2017-06-30 ENCOUNTER — APPOINTMENT (OUTPATIENT)
Dept: PHYSICAL THERAPY | Age: 72
End: 2017-06-30
Payer: MEDICARE

## 2017-07-03 ENCOUNTER — HOSPITAL ENCOUNTER (OUTPATIENT)
Dept: PHYSICAL THERAPY | Age: 72
Discharge: HOME | End: 2017-07-03
Payer: MEDICARE

## 2017-07-03 PROCEDURE — 97110 THERAPEUTIC EXERCISES: CPT

## 2017-07-03 PROCEDURE — 97140 MANUAL THERAPY 1/> REGIONS: CPT

## 2017-07-03 NOTE — PROGRESS NOTES
PT DAILY TREATMENT NOTE - [facility]     Patient Name: Fiona Henry  Date:7/3/2017  : 1945  [x]  Patient  Verified  Payor: [place] MED[place] / Plan: [place] MEDICARE PART A & B / Product Type: Medicare /    In time:240  Out time:345  Total Treatment Time (min): 65  Total Timed Codes (min): 45  1:1 Treatment Time ( [address] only): 40  Visit #: 6 of 16    Treatment Area: Pain in shoulder region, left [M25.512]    SUBJECTIVE  Pain Level (0-10 scale): 0/10  Any medication changes, allergies to medications, adverse drug reactions, diagnosis change, or new procedure performed?: [x] No    [] Yes (see summary sheet for update)  Subjective functional status/changes:   [] No changes reported  \"Some pain in both shoulders. No pain at night. \"    OBJECTIVE    Modality rationale:    Min Type Additional Details    [] Estim:  []Unatt       []IFC  []Premod                        []Other:  []w/ice   []w/heat  Position:  Location:    [] Estim: []Att    []TENS instruct  []NMES                    []Other:  []w/US   []w/ice   []w/heat  Position:  Location:    []  Traction: [] Cervical       []Lumbar                       [] Prone          []Supine                       []Intermittent   []Continuous Lbs:  [] before manual  [] after manual    []  Ultrasound: []Continuous   [] Pulsed                           []1MHz   []3MHz W/cm2:  Location:    []  Iontophoresis with dexamethasone         Location: [] Take home patch   [] In clinic    []  Ice     []  heat  []  Ice massage  []  Laser   []  Anodyne Position:  Location:    []  Laser with stim  []  Other:  Position:  Location:    []  Vasopneumatic Device Pressure:       [] lo [] med [] hi   Temperature: [] lo [] med [] hi   [] Skin assessment post-treatment:  []intact []redness- no adverse reaction    []redness - adverse reaction:      min []Eval                  []Re-Eval       35 min Therapeutic Exercise:  [x] See flow sheet :further instruction in TB ex     Rationale: increase ROM, increase strength, improve coordination and increase proprioception to improve the patients ability to normalize reaching and ADLs. min Therapeutic Activity:  []  See flow sheet :         min Neuromuscular Re-education:  []  See flow sheet :       10 min Manual Therapy: functional massage left scapular region and GHJ distraction at end range Flex for pain control        min Gait Training:  ___ feet with ___ device on level surfaces with ___ level of assist   Rationale: With   [] TE   [] TA   [] neuro   [] other: Patient Education: [x] Review HEP    [] Progressed/Changed HEP based on:   [] positioning   [] body mechanics   [] transfers   [] heat/ice application    [] other:      Other Objective/Functional Measures:   Bilateral shoulder mobility improved Flex 155  FOTO 49/100/ regressed although functionally patient is showing improvement     Pain Level (0-10 scale) post treatment: 0/10    ASSESSMENT/Changes in Function:    Pt continues to report improvement of shoulder pain and function. No aggravation of pain  After progression of intensity of exercises today. Patient will continue to benefit from skilled PT services to modify and progress therapeutic interventions, address ROM deficits, address strength deficits, analyze and address soft tissue restrictions, analyze and cue movement patterns, analyze and modify body mechanics/ergonomics, assess and modify postural abnormalities and instruct in home and community integration to attain remaining goals.      [x]  See Plan of Care  []  See progress note/recertification  []  See Discharge Summary      Progress towards goals / Updated goals:  Short Term Goals (To be completed in 4 weeks)                        1) Goal: Pt will be independent and compliant with HEP to achieve other goals.   Status at initial evaluation: pt is not independent with exercises  Current status:compliant with HEP                            3) Goal: Pt's AROM/PROM of left shoulder will increase by 5-10 degrees per week in order to prepare for strengthening and improve ADLs. Status at initial evaluation: AROM of shoulder flexion: 126 degrees left, 141 degrees right, shoulder extension: 56 degrees left, 57 degrees right, scaption: 138 degrees left, 141 degrees right, abduction: 100 degrees left, 139 degrees right, ER: crown of head left, C2 level right, IR: L1 level left, iliac crest right; PROM of left shoulder flexion: 152 degrees left, scaption: 180 degrees, abduction: 169 degrees, ER: 90 degrees, IR: 57 degrees  Current status: Supine shoulder Flex 155, seated 140, progressing                            3) Goal: Increase strength of left shoulder by 1/3 grade in order to allow pt to reach overhead cabinet and lift up to 3 lbs. without increased pain. Status at initial evaluation: strength of UEs grossly 4/5 to 5/5 bilateral UEs with pain during resisted shoulder flexion bilaterally, abduction bilaterally, and IR on left only  Current status: not reassessed                            4) Decrease pain in left shoulder to 4/10 at worst during ADLs in order to allow pt to sleep at least 5 hours straight. Status at initial evaluation: pain 7/10 at worst  Current status: not reassessed      Long Term Goals (To be completed in 8 weeks)                        3) Goal: Increase AROM/PROM of left shoulder to >90% of normal in all directions without increased pain in order to return to normal function.   Status at initial evaluation:  AROM of shoulder flexion: 126 degrees left, 141 degrees right, shoulder extension: 56 degrees left, 57 degrees right, scaption: 138 degrees left, 141 degrees right, abduction: 100 degrees left, 139 degrees right, ER: crown of head left, C2 level right, IR: L1 level left, iliac crest right  Current status: not reassessed                            2) Goal: Increase strength of left shoulder to 5/5 all muscle groups without increased pain in order to return to normal function. Status at initial evaluation: strength of UEs grossly 4/5 to 5/5 bilateral UEs with pain during resisted shoulder flexion bilaterally, abduction bilaterally, and IR on left only  Current status: not reassessed                            3) Goal: Decrease pain in left shoulder to 2/10 at worst during ADLs in order to allow pt to sleep at least 8 hours straight and normalize function.   Status at initial evaluation: pain 7/10 at worst  Current status: not reassessed    PLAN  [x]  Upgrade activities as tolerated     [x]  Continue plan of care and assist patient with questionnaire next time to assure proper score  []  Update interventions per flow sheet       []  Discharge due to:_  []  Other:_      Blanca Benton, PT 7/3/2017  3:43 PM    Future Appointments  Date Time Provider Jan Gaines   7/6/2017 3:00 PM Blanca Benton, PT [facility]   7/12/2017 3:30 PM Marion Salmon [facility]LL[facility]   7/14/2017 12:30 PM [facility] PT [id number] [facility]PTD [facility]   7/18/2017 2:30 PM Cynthia iDaz PT [facility]   7/20/2017 2:30 PM Cynthia Diaz PT [facility]   7/25/2017 2:30 PM Mervat PETERS PT [facility]PT[facility]   7/27/2017 2:30 PM Marion Huntley PT [facility]

## 2017-07-06 ENCOUNTER — HOSPITAL ENCOUNTER (OUTPATIENT)
Dept: PHYSICAL THERAPY | Age: 72
Discharge: HOME | End: 2017-07-06
Payer: MEDICARE

## 2017-07-06 PROCEDURE — 97530 THERAPEUTIC ACTIVITIES: CPT

## 2017-07-06 PROCEDURE — 97140 MANUAL THERAPY 1/> REGIONS: CPT

## 2017-07-06 NOTE — PROGRESS NOTES
PT DAILY TREATMENT NOTE - [facility]     Patient Name: Jericho See  Date:2017  : 1945  [x]  Patient  Verified  Payor: [place] MEDICARE / Plan: [place] MEDICARE PART A & B / Product Type: Medicare /    In time:3  Out time:350  Total Treatment Time (min): 50  Total Timed Codes (min): 50  1:1 Treatment Time ( [address] only): 30  Visit #: 7 of 16    Treatment Area: Pain in shoulder region, left [M25.512]    SUBJECTIVE  Pain Level (0-10 scale): 0/10  Any medication changes, allergies to medications, adverse drug reactions, diagnosis change, or new procedure performed?: [x] No    [] Yes (see summary sheet for update)  Subjective functional status/changes:   [] No changes reported  \"Feeling pretty good. Some pain. \"    OBJECTIVE    Modality rationale:    Min Type Additional Details    [] Estim:  []Unatt       []IFC  []Premod                        []Other:  []w/ice   []w/heat  Position:  Location:    [] Estim: []Att    []TENS instruct  []NMES                    []Other:  []w/US   []w/ice   []w/heat  Position:  Location:    []  Traction: [] Cervical       []Lumbar                       [] Prone          []Supine                       []Intermittent   []Continuous Lbs:  [] before manual  [] after manual    []  Ultrasound: []Continuous   [] Pulsed                           []1MHz   []3MHz W/cm2:  Location:    []  Iontophoresis with dexamethasone         Location: [] Take home patch   [] In clinic    []  Ice     []  heat  []  Ice massage  []  Laser   []  Anodyne Position:  Location:    []  Laser with stim  []  Other:  Position:  Location:    []  Vasopneumatic Device Pressure:       [] lo [] med [] hi   Temperature: [] lo [] med [] hi   [] Skin assessment post-treatment:  []intact []redness- no adverse reaction    []redness - adverse reaction:      min []Eval                  []Re-Eval       30 min Therapeutic Exercise:  [x] See flow sheet :further instruction in TB ex     Rationale: increase ROM, increase strength, improve coordination and increase proprioception to improve the patients ability to normalize reaching and ADLs. min Therapeutic Activity:  []  See flow sheet :         min Neuromuscular Re-education:  []  See flow sheet :       20 min Manual Therapy: functional massage left scapular region and GHJ distraction at end range Flex for pain control        min Gait Training:  ___ feet with ___ device on level surfaces with ___ level of assist   Rationale: With   [] TE   [] TA   [] neuro   [] other: Patient Education: [x] Review HEP    [] Progressed/Changed HEP based on:   [] positioning   [] body mechanics   [] transfers   [] heat/ice application    [] other:      Other Objective/Functional Measures:   Shoulder Flex to 160, mild impingement at end range     Pain Level (0-10 scale) post treatment: 0/10    ASSESSMENT/Changes in Function:    Pt continues to report improvement of shoulder pain and function. No aggravation of pain  After progression of intensity of exercises today. Patient will continue to benefit from skilled PT services to modify and progress therapeutic interventions, address ROM deficits, address strength deficits, analyze and address soft tissue restrictions, analyze and cue movement patterns, analyze and modify body mechanics/ergonomics, assess and modify postural abnormalities and instruct in home and community integration to attain remaining goals.      [x]  See Plan of Care  []  See progress note/recertification  []  See Discharge Summary      Progress towards goals / Updated goals:  Short Term Goals (To be completed in 4 weeks)                        1) Goal: Pt will be independent and compliant with HEP to achieve other goals.   Status at initial evaluation: pt is not independent with exercises  Current status:compliant with HEP                            2) Goal: Pt's AROM/PROM of left shoulder will increase by 5-10 degrees per week in order to prepare for strengthening and improve ADLs.  Status at initial evaluation: AROM of shoulder flexion: 126 degrees left, 141 degrees right, shoulder extension: 56 degrees left, 57 degrees right, scaption: 138 degrees left, 141 degrees right, abduction: 100 degrees left, 139 degrees right, ER: crown of head left, C2 level right, IR: L1 level left, iliac crest right; PROM of left shoulder flexion: 152 degrees left, scaption: 180 degrees, abduction: 169 degrees, ER: 90 degrees, IR: 57 degrees  Current status: Supine shoulder Flex 155, seated 140, progressing                            3) Goal: Increase strength of left shoulder by 1/3 grade in order to allow pt to reach overhead cabinet and lift up to 3 lbs. without increased pain. Status at initial evaluation: strength of UEs grossly 4/5 to 5/5 bilateral UEs with pain during resisted shoulder flexion bilaterally, abduction bilaterally, and IR on left only  Current status: not reassessed                            4) Decrease pain in left shoulder to 4/10 at worst during ADLs in order to allow pt to sleep at least 5 hours straight. Status at initial evaluation: pain 7/10 at worst  Current status: not reassessed      Long Term Goals (To be completed in 8 weeks)                        2) Goal: Increase AROM/PROM of left shoulder to >90% of normal in all directions without increased pain in order to return to normal function. Status at initial evaluation:  AROM of shoulder flexion: 126 degrees left, 141 degrees right, shoulder extension: 56 degrees left, 57 degrees right, scaption: 138 degrees left, 141 degrees right, abduction: 100 degrees left, 139 degrees right, ER: crown of head left, C2 level right, IR: L1 level left, iliac crest right  Current status: not reassessed                            2) Goal: Increase strength of left shoulder to 5/5 all muscle groups without increased pain in order to return to normal function.   Status at initial evaluation: strength of UEs grossly 4/5 to 5/5 bilateral UEs with pain during resisted shoulder flexion bilaterally, abduction bilaterally, and IR on left only  Current status: not reassessed                            3) Goal: Decrease pain in left shoulder to 2/10 at worst during ADLs in order to allow pt to sleep at least 8 hours straight and normalize function.   Status at initial evaluation: pain 7/10 at worst  Current status: not reassessed    PLAN  [x]  Upgrade activities as tolerated     [x]  Continue plan of care and assist patient with questionnaire next time to assure proper score  []  Update interventions per flow sheet       []  Discharge due to:_  []  Other:_      Adina Inman PT 7/6/2017  3:43 PM    Future Appointments  Date Time Provider Jan Gaines   7/12/2017 3:30 PM Adina Inman PT [facility]   7/14/2017 12:30 PM [facility]   7/18/2017 2:30 PM Gato Kilpatrick PT [facility]   7/20/2017 2:30 PM Gato Kilpatrick PT [facility]   7/25/2017 2:30 PM Roxanne Hillman V PT [facility]   7/27/2017 2:30 PM Marion Javier, PT [facility]PT[facility]       0

## 2017-07-12 ENCOUNTER — HOSPITAL ENCOUNTER (OUTPATIENT)
Dept: PHYSICAL THERAPY | Age: 72
Discharge: HOME | End: 2017-07-12
Payer: MEDICARE

## 2017-07-12 PROCEDURE — 97110 THERAPEUTIC EXERCISES: CPT

## 2017-07-12 PROCEDURE — 97140 MANUAL THERAPY 1/> REGIONS: CPT

## 2017-07-12 NOTE — PROGRESS NOTES
PT DAILY TREATMENT NOTE - [facility]     Patient Name: Valentino South  Date:2017  : 1945  [x]  Patient  Verified  Payor: Galina Driscollory / Plan: [place] MEDICARE PART A & B / Product Type: Medicare /    In time:328  Out time:412  Total Treatment Time (min): 44  Total Timed Codes (min): 34  1:1 Treatment Time ( [address] only): 34  Visit #: 3 of 12    Treatment Area: Pain in shoulder region, left [M25.512]    SUBJECTIVE  Pain Level (0-10 scale): 0/10  Any medication changes, allergies to medications, adverse drug reactions, diagnosis change, or new procedure performed?: [x] No    [] Yes (see summary sheet for update)  Subjective functional status/changes:   [] No changes reported  Pt reports that she might have overdone it this weekend because she has had more pan. She states that she didn't ice that much.     OBJECTIVE    Modality rationale: decrease inflammation and decrease pain to improve the patients ability to complete ADLs   Min Type Additional Details    [] Estim:  []Unatt       []IFC  []Premod                        []Other:  []w/ice   []w/heat  Position:  Location:    [] Estim: []Att    []TENS instruct  []NMES                    []Other:  []w/US   []w/ice   []w/heat  Position:  Location:    []  Traction: [] Cervical       []Lumbar                       [] Prone          []Supine                       []Intermittent   []Continuous Lbs:  [] before manual  [] after manual    []  Ultrasound: []Continuous   [] Pulsed                           []1MHz   []3MHz W/cm2:  Location:    []  Iontophoresis with dexamethasone         Location: [] Take home patch   [] In clinic   10 [x]  Ice     []  heat  []  Ice massage  []  Laser   []  Anodyne Position:supineLocation:left shoulder    []  Laser with stim  []  Other:  Position:  Location:    []  Vasopneumatic Device Pressure:       [] lo [] med [] hi   Temperature: [] lo [] med [] hi   [x] Skin assessment post-treatment:  [x]intact [x]redness- no adverse reaction        14 min Therapeutic Exercise:  [x] See flow sheet :   Rationale: increase ROM, improve coordination and increase proprioception to improve the patients ability to complete ADLs    20 min Manual Therapy:  MFR to left shoulder anterior/posterior, PROM, shoulder oscillations, KT to left anterior shoulder with web approach   Rationale: decrease pain, increase ROM, increase tissue extensibility and decrease edema  to reach overhead          With   [] TE   [] TA   [] neuro   [] other: Patient Education: [x] Review HEP    [] Progressed/Changed HEP based on:   [] positioning   [] body mechanics   [] transfers   [] heat/ice application    [] other:      Other Objective/Functional Measures:      Pain Level (0-10 scale) post treatment: 0/10    ASSESSMENT/Changes in Function: Pt demonstrates significant inflammation in anterior left shoulder contributing to increased pain and difficulty with all activities. Pt demonstrates difficulty with all exercise so therapist focused on soft tissue work and edema control. Pt educated on edema management at home and modification of behavior. Patient will continue to benefit from skilled PT services to address functional mobility deficits, address ROM deficits, address strength deficits and analyze and address soft tissue restrictions to attain remaining goals. []  See Plan of Care  []  See progress note/recertification  []  See Discharge Summary         Progress towards goals / Updated goals:                       5) Goal: Pt will be independent and compliant with HEP to achieve other goals. Status at initial evaluation: pt is not independent with exercises  Current status:compliant with HEP                            2) Goal: Pt's AROM/PROM of left shoulder will increase by 5-10 degrees per week in order to prepare for strengthening and improve ADLs.   Status at initial evaluation: AROM of shoulder flexion: 126 degrees left, 141 degrees right, shoulder extension: 56 degrees left, 57 degrees right, scaption: 138 degrees left, 141 degrees right, abduction: 100 degrees left, 139 degrees right, ER: crown of head left, C2 level right, IR: L1 level left, iliac crest right; PROM of left shoulder flexion: 152 degrees left, scaption: 180 degrees, abduction: 169 degrees, ER: 90 degrees, IR: 57 degrees  Current status: Supine shoulder Flex 155, seated 140, progressing                            3) Goal: Increase strength of left shoulder by 1/3 grade in order to allow pt to reach overhead cabinet and lift up to 3 lbs. without increased pain. Status at initial evaluation: strength of UEs grossly 4/5 to 5/5 bilateral UEs with pain during resisted shoulder flexion bilaterally, abduction bilaterally, and IR on left only  Current status: not reassessed                            4) Decrease pain in left shoulder to 4/10 at worst during ADLs in order to allow pt to sleep at least 5 hours straight. Status at initial evaluation: pain 7/10 at worst  Current status: not reassessed      Long Term Goals (To be completed in 8 weeks)                        1) Goal: Increase AROM/PROM of left shoulder to >90% of normal in all directions without increased pain in order to return to normal function. Status at initial evaluation:  AROM of shoulder flexion: 126 degrees left, 141 degrees right, shoulder extension: 56 degrees left, 57 degrees right, scaption: 138 degrees left, 141 degrees right, abduction: 100 degrees left, 139 degrees right, ER: crown of head left, C2 level right, IR: L1 level left, iliac crest right  Current status: not reassessed                            2) Goal: Increase strength of left shoulder to 5/5 all muscle groups without increased pain in order to return to normal function.   Status at initial evaluation: strength of UEs grossly 4/5 to 5/5 bilateral UEs with pain during resisted shoulder flexion bilaterally, abduction bilaterally, and IR on left only  Current status: not reassessed                            0) Goal: Decrease pain in left shoulder to 2/10 at worst during ADLs in order to allow pt to sleep at least 8 hours straight and normalize function.   Status at initial evaluation: pain 7/10 at worst  Current status: not reassessed       PLAN  []  Upgrade activities as tolerated     []  Continue plan of care  []  Update interventions per flow sheet       []  Discharge due to:_  []  Other:_      Garfield Braden PTA 7/12/2017  2:52 PM    Future Appointments  Date Time Provider Jan Gaines   7/12/2017 3:30 PM [facility]   7/14/2017 12:30 PM [facility]   7/18/2017 2:30 PM Ashwini Conway, PT [facility]   7/20/2017 2:30 PM Ashwini Conway PT [facility]   7/25/2017 2:30 PM Ely Monzon V PT [facility]   7/27/2017 2:30 PM Marion Raman, PT [facility]

## 2017-07-14 ENCOUNTER — HOSPITAL ENCOUNTER (OUTPATIENT)
Dept: PHYSICAL THERAPY | Age: 72
Discharge: HOME | End: 2017-07-14
Payer: MEDICARE

## 2017-07-14 PROCEDURE — 97110 THERAPEUTIC EXERCISES: CPT

## 2017-07-14 PROCEDURE — 97140 MANUAL THERAPY 1/> REGIONS: CPT

## 2017-07-14 NOTE — PROGRESS NOTES
PT DAILY TREATMENT NOTE - [facility]     Patient Name: Celestine Stroud  Date:2017  : 1945  [x]  Patient  Verified  Payor: Laura Carlosdington / Plan: [place] / Product Type: Medicare /    In time: 12:28  Out time: 1:20  Total Treatment Time (min): 50  Total Timed Codes (min): 27  1:1 Treatment Time ( [address] only): 30  Visit #: 9 of 16    Treatment Area: Pain in shoulder region, left [M25.512]    SUBJECTIVE  Pain Level (0-10 scale):  0/10 (without movement - movement = 5-6/10)  Any medication changes, allergies to medications, adverse drug reactions, diagnosis change, or new procedure performed?: [x] No    [] Yes (see summary sheet for update)  Subjective functional status/changes:   [x] No changes reported  Patient reports that she is \"about the same\" in regards to shoulder since start of therapy. She still cannot use it above her shoulder; movements out to side and behind hurt. Many of the exercises aggravate it. OBJECTIVE    Modality rationale: decrease inflammation and decrease pain to improve the patients ability to use L UE in ADL's above waist level.     Min Type Additional Details    [] Estim:  []Unatt       []IFC  []Premod                        []Other:  []w/ice   []w/heat  Position:  Location:    [] Estim: []Att    []TENS instruct  []NMES                    []Other:  []w/US   []w/ice   []w/heat  Position:  Location:    []  Traction: [] Cervical       []Lumbar                       [] Prone          []Supine                       []Intermittent   []Continuous Lbs:  [] before manual  [] after manual    []  Ultrasound: []Continuous   [] Pulsed                           []1MHz   []3MHz W/cm2:  Location:    []  Iontophoresis with dexamethasone         Location: [] Take home patch   [] In clinic   15 [x]  Ice     []  heat  []  Ice massage  []  Laser   []  Anodyne Position:  Location:  L shoulder     []  Laser with stim  []  Other:  Position:  Location:    []  Vasopneumatic Device Pressure: [] lo [] med [] hi   Temperature: [] lo [] med [] hi   [x] Skin assessment post-treatment:  [x]intact []redness- no adverse reaction    []redness - adverse reaction:     15 min Therapeutic Exercise:  [x] See flow sheet : modified program 2° p! Focus on/added scapular strengthening vs direct use of RTC    Rationale: increase ROM and increase strength to improve the patients ability to use L shoulder without pain in ADL's/above shoulder UE activities. 12 min Manual Therapy:  B scap mobs, L GH mobs, PROM flex, scap. L UT TPR. Rationale: decrease pain, increase ROM, increase tissue extensibility and decrease trigger points to relieve pain in L shoulder. With   [] TE   [] TA   [] neuro   [] other: Patient Education: [x] Review HEP    [] Progressed/Changed HEP based on:   [] positioning   [] body mechanics   [] transfers   [] heat/ice application    [] other:        Pain Level (0-10 scale) post treatment: 0/10    ASSESSMENT/Changes in Function:  Swelling is notably decreased since last session - patient still wearing K-tape. Patient has near full range of L shoulder motion in PROM - AROM abduction and ER is significantly reduced; Focus today on scapular strengthening and posterior chain muscular support for that shoulder, as direct activities that attempt to use supraspinatus muscle just hurt and are limited. Edema is better since last visit w/KT and use of ice. Patient MAY continue to benefit from skilled PT services to modify and progress therapeutic interventions, address functional mobility deficits, address ROM deficits, address strength deficits, analyze and address soft tissue restrictions, analyze and cue movement patterns, analyze and modify body mechanics/ergonomics and assess and modify postural abnormalities to attain remaining goals; she has been advised to relay to her status quo in therapy. Progress NOTE is due, as patient sees MD next week.       [x]  See Plan of Care  []  See progress note/recertification  []  See Discharge Summary         Progress towards goals / Updated goals:                       0) Goal: Pt will be independent and compliant with HEP to achieve other goals. Status at initial evaluation: pt is not independent with exercises  Current status:compliant with HEP                            2) Goal: Pt's AROM/PROM of left shoulder will increase by 5-10 degrees per week in order to prepare for strengthening and improve ADLs. Status at initial evaluation: AROM of shoulder flexion: 126 degrees left, 141 degrees right, shoulder extension: 56 degrees left, 57 degrees right, scaption: 138 degrees left, 141 degrees right, abduction: 100 degrees left, 139 degrees right, ER: crown of head left, C2 level right, IR: L1 level left, iliac crest right; PROM of left shoulder flexion: 152 degrees left, scaption: 180 degrees, abduction: 169 degrees, ER: 90 degrees, IR: 57 degrees  Current status: unable to abduct past 80 without pain/assisting movement                             3) Goal: Increase strength of left shoulder by 1/3 grade in order to allow pt to reach overhead cabinet and lift up to 3 lbs. without increased pain. Status at initial evaluation: strength of UEs grossly 4/5 to 5/5 bilateral UEs with pain during resisted shoulder flexion bilaterally, abduction bilaterally, and IR on left only  Current status: not progressing with RTC strength - have added scapular stabilization and strength ex's                            4) Decrease pain in left shoulder to 4/10 at worst during ADLs in order to allow pt to sleep at least 5 hours straight. Status at initial evaluation: pain 7/10 at worst  Current status: pain 5-6/10 in the ranges of abd, ER and abd with ER       Long Term Goals (To be completed in 8 weeks)                        1) Goal: Increase AROM/PROM of left shoulder to >90% of normal in all directions without increased pain in order to return to normal function.   Status at initial evaluation:  AROM of shoulder flexion: 126 degrees left, 141 degrees right, shoulder extension: 56 degrees left, 57 degrees right, scaption: 138 degrees left, 141 degrees right, abduction: 100 degrees left, 139 degrees right, ER: crown of head left, C2 level right, IR: L1 level left, iliac crest right  Current status: no improvement in active abduction                             2) Goal: Increase strength of left shoulder to 5/5 all muscle groups without increased pain in order to return to normal function. Status at initial evaluation: strength of UEs grossly 4/5 to 5/5 bilateral UEs with pain during resisted shoulder flexion bilaterally, abduction bilaterally, and IR on left only  Current status: not reassessed                            3) Goal: Decrease pain in left shoulder to 2/10 at worst during ADLs in order to allow pt to sleep at least 8 hours straight and normalize function.   Status at initial evaluation: pain 7/10 at worst  Current status: pain is 0/10 at rest; 5-06/10 with movement and loading of L shoulder            PLAN  []  Upgrade activities as tolerated     [x]  Continue plan of care  []  Update interventions per flow sheet       []  Discharge due to:_  []  Other:_      Jonathan Domingo, PT 7/14/2017  2:23 PM    Future Appointments  Date Time Provider Jan Gaines   7/18/2017 2:30 PM Vasiliy Sheets PT [facility]   7/20/2017 2:30 PM ISI Isbell [facility]   7/25/2017 2:30 PM ISI Patterson [facility]   7/27/2017 2:30 PM Marion Mirza, PT [facility]

## 2017-07-18 ENCOUNTER — HOSPITAL ENCOUNTER (OUTPATIENT)
Dept: PHYSICAL THERAPY | Age: 72
Discharge: HOME | End: 2017-07-18
Payer: MEDICARE

## 2017-07-18 PROCEDURE — G8987 SELF CARE CURRENT STATUS: HCPCS

## 2017-07-18 PROCEDURE — G8988 SELF CARE GOAL STATUS: HCPCS

## 2017-07-18 PROCEDURE — 97110 THERAPEUTIC EXERCISES: CPT

## 2017-07-18 PROCEDURE — 97164 PT RE-EVAL EST PLAN CARE: CPT

## 2017-07-18 NOTE — PROGRESS NOTES
PT DAILY TREATMENT NOTE - [facility]     Patient Name: Marlin Rollins  Date:2017  : 1945  [x]  Patient  Verified  Payor: Ehsan Melara / Plan: [place] MEDICARE PART A & B / Product Type: Medicare /    In time:2:35  Out time:3:25  Total Treatment Time (min): 50  Total Timed Codes (min): 30  1:1 Treatment Time ( [address] only): 50   Visit #: 10 of 16    Treatment Area: Pain in shoulder region, left [M25.512]    SUBJECTIVE   Pain Level (0-10 scale): 0/10  Any medication changes, allergies to medications, adverse drug reactions, diagnosis change, or new procedure performed?: [x] No    [] Yes (see summary sheet for update)  Subjective functional status/changes:   [] No changes reported  \"Pain 3/10 at worst at the end of the day when laying down, but no pain when doing everyday activities. I'm doing only 10% of my normal activities. \"    OBJECTIVE    Modality rationale:    Min Type Additional Details    [] Estim:  []Unatt       []IFC  []Premod                        []Other:  []w/ice   []w/heat  Position:  Location:    [] Estim: []Att    []TENS instruct  []NMES                    []Other:  []w/US   []w/ice   []w/heat  Position:  Location:    []  Traction: [] Cervical       []Lumbar                       [] Prone          []Supine                       []Intermittent   []Continuous Lbs:  [] before manual  [] after manual    []  Ultrasound: []Continuous   [] Pulsed                           []1MHz   []3MHz W/cm2:  Location:    []  Iontophoresis with dexamethasone         Location: [] Take home patch   [] In clinic    []  Ice     []  heat  []  Ice massage  []  Laser   []  Anodyne Position:  Location:    []  Laser with stim  []  Other:  Position:  Location:    []  Vasopneumatic Device Pressure:       [] lo [] med [] hi   Temperature: [] lo [] med [] hi   [] Skin assessment post-treatment:  []intact []redness- no adverse reaction    []redness - adverse reaction:     20 min []Eval                  [x]Re-Eval       30 min Therapeutic Exercise:  [] See flow sheet :   Rationale: increase ROM and increase strength to improve the patients ability to use left shoulder without pain in ADLs and above shoulder UE activities. min Therapeutic Activity:  []  See flow sheet :         min Neuromuscular Re-education:  []  See flow sheet :        min Manual Therapy:          min Gait Training:  ___ feet with ___ device on level surfaces with ___ level of assist   Rationale: With   [] TE   [] TA   [] neuro   [] other: Patient Education: [x] Review HEP    [] Progressed/Changed HEP based on:   [] positioning   [] body mechanics   [] transfers   [] heat/ice application    [] other:      Other Objective/Functional Measures:   AROM/PROM shoulder flexion: 159 degrees (improved 33 degrees)/ 150 degrees (decreased 2 degrees)  Abduction: 157 degrees (improved 57 degrees)/ 165 degrees (decreased 4 degrees)  Scaption: 142 degrees with pain (improved 4 degrees) 180 degrees (no change)  ER: C6 level/ (improved 8 inches)74 degrees (decreased 16 degrees)  IR: T10 level (/68 degrees (improved 11 degrees)  Strength left shoulder flexion: 4/5 no pain (no change)  Shoulder extension: 5/5 (no change)  Abduction: 4+/5 (no change)  ER: 4+/5 (improved 1/3 grade)  IR: 4+/5 (worsened 1/3 grade)  Elbow flexion: 5/5 (no change)  Elbow extension: 5/5 (no change)    Pain Level (0-10 scale) post treatment: 0/10    ASSESSMENT/Changes in Function:    Pt displays significant improvement in AROM of the left shoulder, but no significant improvements of PROM of the shoulder and function. Pt reports mild pain at worst during ADLs, but only mld improvements in function (10% improvement). Strength of left UE has not improved except ER/IR. Pt would benefit from additional skilled PT to address remaining deficits of ROM and strength, and muscular dysfunction.     Patient will continue to benefit from skilled PT services to modify and progress therapeutic interventions, address ROM deficits, address strength deficits, analyze and address soft tissue restrictions, analyze and cue movement patterns, analyze and modify body mechanics/ergonomics, assess and modify postural abnormalities and instruct in home and community integration to attain remaining goals. []  See Plan of Care  []  See progress note/recertification  []  See Discharge Summary         Progress towards goals / Updated goals:     1) Goal: Pt will be independent and compliant with HEP to achieve other goals. Status at initial evaluation: pt is not independent with exercises  Current status: progressing (pt is over 75% independent with exercises) 7/18/17                            2) Goal: Pt's AROM/PROM of left shoulder will increase by 5-10 degrees per week in order to prepare for strengthening and improve ADLs. Status at initial evaluation: AROM of shoulder flexion: 126 degrees left, 141 degrees right, shoulder extension: 56 degrees left, 57 degrees right, scaption: 138 degrees left, 141 degrees right, abduction: 100 degrees left, 139 degrees right, ER: crown of head left, C2 level right, IR: L1 level left, iliac crest right; PROM of left shoulder flexion: 152 degrees left, scaption: 180 degrees, abduction: 169 degrees, ER: 90 degrees, IR: 57 degrees  Current status: met for AROM, but not met for PROM (AROM/PROM shoulder flexion: 159 degrees (improved 33 degrees)/ 150 degrees (decreased 2 degrees), Abduction: 157 degrees (improved 57 degrees)/165 degrees (decreased 4 degrees), Scaption: 142 degrees with pain (improved 4 degrees)/180 degrees (no change), ER: C6 level/ (improved 8 inches)/74 degrees (decreased 16 degrees), IR: T10 level (improved 3 levels)/68 degrees (improved 11 degrees)) 7/18/17                            3) Goal: Increase strength of left shoulder by 1/3 grade in order to allow pt to reach overhead cabinet and lift up to 3 lbs. without increased pain.   Status at initial evaluation: strength of UEs grossly 4/5 to 5/5 bilateral UEs with pain during resisted shoulder flexion bilaterally, abduction bilaterally, and IR on left only  Current status: met for shoulder ER only (Strength left shoulder flexion: 4/5 no pain (no change), Shoulder extension: 5/5 (no change), Abduction: 4+/5 (no change), ER: 4+/5 (improved 1/3 grade), IR: 4+/5 (worsened 1/3 grade), Elbow flexion: 5/5 (no change), Elbow extension: 5/5 (no change)) 7/18/17                            4) Decrease pain in left shoulder to 4/10 at worst during ADLs in order to allow pt to sleep at least 5 hours straight. Status at initial evaluation: pain 7/10 at worst  Current status: progressing (pain 5-6/10 in the ranges of abd, ER and abd with ER)       Long Term Goals (To be completed in 8 weeks)                        1) Goal: Increase AROM/PROM of left shoulder to >90% of normal in all directions without increased pain in order to return to normal function. Status at initial evaluation:  AROM of shoulder flexion: 126 degrees left, 141 degrees right, shoulder extension: 56 degrees left, 57 degrees right, scaption: 138 degrees left, 141 degrees right, abduction: 100 degrees left, 139 degrees right, ER: crown of head left, C2 level right, IR: L1 level left, iliac crest right  Current status: AROM shoulder flexion: 159 degrees (improved 33 degrees), Abduction: 157 degrees (improved 57 degrees), Scaption: 142 degrees with pain (improved 4 degrees), ER: C6 level/ (improved 8 inches), IR: T10 level (improved 3 levels)) 7/18/17                            2) Goal: Increase strength of left shoulder to 5/5 all muscle groups without increased pain in order to return to normal function.   Status at initial evaluation: strength of UEs grossly 4/5 to 5/5 bilateral UEs with pain during resisted shoulder flexion bilaterally, abduction bilaterally, and IR on left only  Current status: progressing (Strength left shoulder flexion: 4/5 no pain (no change), Shoulder extension: 5/5 (no change), Abduction: 4+/5 (no change), ER: 4+/5 (improved 1/3 grade), IR: 4+/5 (worsened 1/3 grade), Elbow flexion: 5/5 (no change), Elbow extension: 5/5 (no change)) 7/18/17                            3) Goal: Decrease pain in left shoulder to 2/10 at worst during ADLs in order to allow pt to sleep at least 8 hours straight and normalize function.   Status at initial evaluation: pain 7/10 at worst  Current status: pain is 0/10 at rest; 5-6/10 with movement and loading of left shoulder    PLAN  []  Upgrade activities as tolerated     [x]  Continue plan of care  []  Update interventions per flow sheet       []  Discharge due to:_  []  Other:_      Gato Kilpatrick PT 7/18/2017  2:52 PM    Future Appointments  Date Time Provider Jan Gaines   7/20/2017 2:30 PM Gato Kilpatrick, PT [facility]   7/25/2017 2:30 PM Roxanne Hillman V PT [facility]   7/27/2017 2:30 PM Marion Javier, PT [facility]

## 2017-07-18 NOTE — PROGRESS NOTES
[facility] at [facility]. Osei Cervantes, [facility] [address] [place] [address]  Phone: [phone] Fax: [phone]    Continued Plan of Care/ Re-certification for Physical Therapy Services    Patient name: Lamine Benoit Start of Care: 6/15/17   Referral source: Al Kevin MD : 1945   Medical/Treatment Diagnosis: Pain in shoulder region, left [M25.512], left rotator cuff tear Onset Date: late 2017   Prior Hospitalization: see medical history Provider#: [id number]   Medications: Verified on Patient Summary List    Comorbidities: DM, HTN, thyroid problems, CA   Prior Level of Function: no deficits  Visits from Start of Care: 10    Missed Visits: 0    The Plan of Care and following information is based on the patient's current status:    1) Goal: Pt will be independent and compliant with HEP to achieve other goals. Status at initial evaluation: pt is not independent with exercises  Current status: progressing (pt is over 75% independent with exercises)                            2) Goal: Pt's AROM/PROM of left shoulder will increase by 5-10 degrees per week in order to prepare for strengthening and improve ADLs.   Status at initial evaluation: AROM of shoulder flexion: 126 degrees left, 141 degrees right, shoulder extension: 56 degrees left, 57 degrees right, scaption: 138 degrees left, 141 degrees right, abduction: 100 degrees left, 139 degrees right, ER: crown of head left, C2 level right, IR: L1 level left, iliac crest right; PROM of left shoulder flexion: 152 degrees left, scaption: 180 degrees, abduction: 169 degrees, ER: 90 degrees, IR: 57 degrees  Current status: met for AROM, but not met for PROM (AROM/PROM shoulder flexion: 159 degrees (improved 33 degrees)/ 150 degrees (decreased 2 degrees), Abduction: 157 degrees (improved 57 degrees)/165 degrees (decreased 4 degrees), Scaption: 142 degrees with pain (improved 4 degrees)/180 degrees (no change), ER: C6 level/ (improved 8 inches)/74 degrees (decreased 16 degrees), IR: T10 level (improved 3 levels)/68 degrees (improved 11 degrees))                            3) Goal: Increase strength of left shoulder by 1/3 grade in order to allow pt to reach overhead cabinet and lift up to 3 lbs. without increased pain. Status at initial evaluation: strength of UEs grossly 4/5 to 5/5 bilateral UEs with pain during resisted shoulder flexion bilaterally, abduction bilaterally, and IR on left only  Current status: met for shoulder ER only (Strength left shoulder flexion: 4/5 no pain (no change), Shoulder extension: 5/5 (no change), Abduction: 4+/5 (no change), ER: 4+/5 (improved 1/3 grade), IR: 4+/5 (worsened 1/3 grade), Elbow flexion: 5/5 (no change), Elbow extension: 5/5 (no change))                            4) Goal: Decrease pain in left shoulder to 4/10 at worst during ADLs in order to allow pt to sleep at least 5 hours straight. Status at initial evaluation: pain 7/10 at worst  Current status: progressing (pain 5-6/10 in the ranges of abd, ER and abd with ER)       Long Term Goals (To be completed in 8 weeks)                        1) Goal: Increase AROM/PROM of left shoulder to >90% of normal in all directions without increased pain in order to return to normal function.   Status at initial evaluation:  AROM of shoulder flexion: 126 degrees left, 141 degrees right, shoulder extension: 56 degrees left, 57 degrees right, scaption: 138 degrees left, 141 degrees right, abduction: 100 degrees left, 139 degrees right, ER: crown of head left, C2 level right, IR: L1 level left, iliac crest right  Current status: AROM shoulder flexion: 159 degrees (improved 33 degrees), Abduction: 157 degrees (improved 57 degrees), Scaption: 142 degrees with pain (improved 4 degrees), ER: C6 level/ (improved 8 inches), IR: T10 level (improved 3 levels))                            2) Goal: Increase strength of left shoulder to 5/5 all muscle groups without increased pain in order to return to normal function. Status at initial evaluation: strength of UEs grossly 4/5 to 5/5 bilateral UEs with pain during resisted shoulder flexion bilaterally, abduction bilaterally, and IR on left only  Current status: progressing (Strength left shoulder flexion: 4/5 no pain (no change), Shoulder extension: 5/5 (no change), Abduction: 4+/5 (no change), ER: 4+/5 (improved 1/3 grade), IR: 4+/5 (worsened 1/3 grade), Elbow flexion: 5/5 (no change), Elbow extension: 5/5 (no change))                            3) Goal: Decrease pain in left shoulder to 2/10 at worst during ADLs in order to allow pt to sleep at least 8 hours straight and normalize function. Status at initial evaluation: pain 7/10 at worst  Current status: progressing (pain is 0/10 at rest; 5-6/10 with movement and loading of left shoulder)    Key functional changes:               Pt displays significant improvement in AROM of the left shoulder, but no significant improvements of PROM of the shoulder and function. Pt reports mild pain at worst during ADLs, but only mld improvements in function (10% improvement). Strength of left UE has not improved except ER/IR. Problems/ barriers to goal attainment: none     Problem List: pain affecting function, decrease ROM, decrease strength, decrease ADL/ functional abilitiies, decrease activity tolerance and decrease flexibility/ joint mobility    Treatment Plan may include any combination of the following: Therapeutic exercise, Therapeutic activities, Neuromuscular re-education, Physical agent/modality, Manual therapy and Patient education    Patient Goal (s) has been updated and includes: \"I would love to use my left arm more. \"     Goals for this certification period to be accomplished in 4 weeks:   1) Continue with unmet goals above.     Frequency / Duration: Patient to be seen 2 times per week for 4 weeks:    Assessment / Recommendations:   Pt would benefit from additional skilled PT to address remaining deficits of ROM and strength, and muscular dysfunction. G-Codes (GP)  Self Care   Current  CJ= 20-39%  [id number] Goal  CJ= 20-39%    The severity rating is based on clinical judgment and the FOTO score. Certification Period: 6/15/17 to 8/13/17    Arian Rivers, PT 7/18/2017 4:37 PM    ________________________________________________________________________  I certify that the above Therapy Services are being furnished while the patient is under my care. I agree with the treatment plan and certify that this therapy is necessary. [] I have read the above and request that my patient continue as recommended. [] I have read the above report and request that my patient continue therapy with the following changes/special instructions: _______________________________________  [] I have read the above report and request that my patient be discharged from therapy    Physician's Signature:________________________________Date:___________Time:__________    Please sign and return to [facility] at [facility].  Martín Booker [address]  Phone: [phone] Fax: [phone]

## 2017-07-20 ENCOUNTER — APPOINTMENT (OUTPATIENT)
Dept: PHYSICAL THERAPY | Age: 72
End: 2017-07-20
Payer: MEDICARE

## 2017-07-25 ENCOUNTER — APPOINTMENT (OUTPATIENT)
Dept: PHYSICAL THERAPY | Age: 72
End: 2017-07-25
Payer: MEDICARE

## 2017-07-27 ENCOUNTER — HOSPITAL ENCOUNTER (OUTPATIENT)
Dept: PHYSICAL THERAPY | Age: 72
Discharge: HOME | End: 2017-07-27
Payer: MEDICARE

## 2017-07-27 PROCEDURE — 97110 THERAPEUTIC EXERCISES: CPT

## 2017-07-27 PROCEDURE — 97530 THERAPEUTIC ACTIVITIES: CPT

## 2017-07-27 NOTE — PROGRESS NOTES
PT DAILY TREATMENT NOTE - [facility]     Patient Name: Jahaira Willard  Date:2017  : 1945  [x]  Patient  Verified  Payor: [place] MED[place] / Plan: [place] MEDICARE PART A & B / Product Type: Medicare /    In time:2:35  Out time:3:22  Total Treatment Time (min): 57  Total Timed Codes (min): 57  1:1 Treatment Time ( only): 40   Visit #: 11 of 16    Treatment Area: Pain in shoulder region, left [M25.512]    SUBJECTIVE   Pain Level (0-10 scale): 2-3/10  Any medication changes, allergies to medications, adverse drug reactions, diagnosis change, or new procedure performed?: [x] No    [] Yes (see summary sheet for update)  Subjective functional status/changes:   [] No changes reported  \" The hardest thing is to reach to my 3rd shelf in the kitchen and reaching laterally\"    OBJECTIVE    Modality rationale:    Min Type Additional Details    [] Estim:  []Unatt       []IFC  []Premod                        []Other:  []w/ice   []w/heat  Position:  Location:    [] Estim: []Att    []TENS instruct  []NMES                    []Other:  []w/US   []w/ice   []w/heat  Position:  Location:    []  Traction: [] Cervical       []Lumbar                       [] Prone          []Supine                       []Intermittent   []Continuous Lbs:  [] before manual  [] after manual    []  Ultrasound: []Continuous   [] Pulsed                           []1MHz   []3MHz W/cm2:  Location:    []  Iontophoresis with dexamethasone         Location: [] Take home patch   [] In clinic    []  Ice     []  heat  []  Ice massage  []  Laser   []  Anodyne Position:  Location:    []  Laser with stim  []  Other:  Position:  Location:    []  Vasopneumatic Device Pressure:       [] lo [] med [] hi   Temperature: [] lo [] med [] hi   [] Skin assessment post-treatment:  []intact []redness- no adverse reaction    []redness - adverse reaction:      min []Eval                  [x]Re-Eval       30 min Therapeutic Exercise:  [x] See flow sheet :   Rationale: increase ROM and increase strength to improve the patients ability to use left shoulder without pain in ADLs and above shoulder UE activities. 17 min Therapeutic Activity:  [x]  See flow sheet :         min Neuromuscular Re-education:  []  See flow sheet :        min Manual Therapy:          min Gait Training:  ___ feet with ___ device on level surfaces with ___ level of assist   Rationale: With   [] TE   [] TA   [] neuro   [] other: Patient Education: [x] Review HEP    [] Progressed/Changed HEP based on:   [] positioning   [] body mechanics   [] transfers   [] heat/ice application    [] other:      Other Objective/Functional Measures:   AROM left shoulder seated: Flex 120, ABD 80  Fatigue with any sustained overhead reaching and lateral reaching    Pain Level (0-10 scale) post treatment: 0/10    ASSESSMENT/Changes in Function:    Shoulder feeling very weak and tired now, no pain    Patient will continue to benefit from skilled PT services to modify and progress therapeutic interventions, address ROM deficits, address strength deficits, analyze and address soft tissue restrictions, analyze and cue movement patterns, analyze and modify body mechanics/ergonomics, assess and modify postural abnormalities and instruct in home and community integration to attain remaining goals. [x]  See Plan of Care  []  See progress note/recertification  []  See Discharge Summary         Progress towards goals / Updated goals:     1) Goal: Pt will be independent and compliant with HEP to achieve other goals. Status at initial evaluation: pt is not independent with exercises  Current status: progressing (pt is over 75% independent with exercises) 7/18/17                            2) Goal: Pt's AROM/PROM of left shoulder will increase by 5-10 degrees per week in order to prepare for strengthening and improve ADLs.   Status at initial evaluation: AROM of shoulder flexion: 126 degrees left, 141 degrees right, shoulder extension: 56 degrees left, 57 degrees right, scaption: 138 degrees left, 141 degrees right, abduction: 100 degrees left, 139 degrees right, ER: crown of head left, C2 level right, IR: L1 level left, iliac crest right; PROM of left shoulder flexion: 152 degrees left, scaption: 180 degrees, abduction: 169 degrees, ER: 90 degrees, IR: 57 degrees  Current status: met for AROM, but not met for PROM (AROM/PROM shoulder flexion: 159 degrees (improved 33 degrees)/ 150 degrees (decreased 2 degrees), Abduction: 157 degrees (improved 57 degrees)/165 degrees (decreased 4 degrees), Scaption: 142 degrees with pain (improved 4 degrees)/180 degrees (no change), ER: C6 level/ (improved 8 inches)/74 degrees (decreased 16 degrees), IR: T10 level (improved 3 levels)/68 degrees (improved 11 degrees)) 7/18/17                            3) Goal: Increase strength of left shoulder by 1/3 grade in order to allow pt to reach overhead cabinet and lift up to 3 lbs. without increased pain. Status at initial evaluation: strength of UEs grossly 4/5 to 5/5 bilateral UEs with pain during resisted shoulder flexion bilaterally, abduction bilaterally, and IR on left only  Current status: met for shoulder ER only (Strength left shoulder flexion: 4/5 no pain (no change), Shoulder extension: 5/5 (no change), Abduction: 4+/5 (no change), ER: 4+/5 (improved 1/3 grade), IR: 4+/5 (worsened 1/3 grade), Elbow flexion: 5/5 (no change), Elbow extension: 5/5 (no change)) 7/18/17                            4) Decrease pain in left shoulder to 4/10 at worst during ADLs in order to allow pt to sleep at least 5 hours straight. Status at initial evaluation: pain 7/10 at worst  Current status: progressing (pain 5-6/10 in the ranges of abd, ER and abd with ER)       Long Term Goals (To be completed in 8 weeks)                        1) Goal: Increase AROM/PROM of left shoulder to >90% of normal in all directions without increased pain in order to return to normal function.   Status at initial evaluation:  AROM of shoulder flexion: 126 degrees left, 141 degrees right, shoulder extension: 56 degrees left, 57 degrees right, scaption: 138 degrees left, 141 degrees right, abduction: 100 degrees left, 139 degrees right, ER: crown of head left, C2 level right, IR: L1 level left, iliac crest right  Current status: AROM shoulder flexion: 159 degrees (improved 33 degrees), Abduction: 157 degrees (improved 57 degrees), Scaption: 142 degrees with pain (improved 4 degrees), ER: C6 level/ (improved 8 inches), IR: T10 level (improved 3 levels)) 7/18/17                            2) Goal: Increase strength of left shoulder to 5/5 all muscle groups without increased pain in order to return to normal function. Status at initial evaluation: strength of UEs grossly 4/5 to 5/5 bilateral UEs with pain during resisted shoulder flexion bilaterally, abduction bilaterally, and IR on left only  Current status: progressing (Strength left shoulder flexion: 4/5 no pain (no change), Shoulder extension: 5/5 (no change), Abduction: 4+/5 (no change), ER: 4+/5 (improved 1/3 grade), IR: 4+/5 (worsened 1/3 grade), Elbow flexion: 5/5 (no change), Elbow extension: 5/5 (no change)) 7/18/17                            3) Goal: Decrease pain in left shoulder to 2/10 at worst during ADLs in order to allow pt to sleep at least 8 hours straight and normalize function.   Status at initial evaluation: pain 7/10 at worst  Current status: pain is 0/10 at rest; 5-6/10 with movement and loading of left shoulder    PLAN  []  Upgrade activities as tolerated     [x]  Continue plan of care  []  Update interventions per flow sheet       []  Discharge due to:_  []  Other:_      José Rai, PT 7/27/2017  2:52 PM    Future Appointments  Date Time Provider Jan Gaines   8/1/2017 10:30 AM ISI Olivares [facility]   8/3/2017 10:30 AM ISI Conway [facility]   8/8/2017 10:30 AM ISI Conway [facility]   8/10/2017 10:30 AM Marion Vikram [facility]   8/15/2017 10:30 AM ISI Bowman [facility]   8/17/2017 10:30 AM ISI Knight [facility]

## 2017-08-01 ENCOUNTER — HOSPITAL ENCOUNTER (OUTPATIENT)
Dept: PHYSICAL THERAPY | Age: 72
Discharge: HOME | End: 2017-08-01
Payer: MEDICARE

## 2017-08-01 PROCEDURE — 97110 THERAPEUTIC EXERCISES: CPT

## 2017-08-01 NOTE — PROGRESS NOTES
PT DAILY TREATMENT NOTE - [facility]     Patient Name: Jericho See  Date:2017  : 1945  [x]  Patient  Verified  Payor: [employer] / Plan: [place] MEDICARE PART A & B / Product Type: Medicare /    In time:10:30  Out time:11:14  Total Treatment Time (min): 44  Total Timed Codes (min): 44  1:1 Treatment Time ( [address] only): 40   Visit #: 12 of 18    Treatment Area: Pain in shoulder region, left [M25.512]    SUBJECTIVE  Pain Level (0-10 scale): 0/10  Any medication changes, allergies to medications, adverse drug reactions, diagnosis change, or new procedure performed?: [x] No    [] Yes (see summary sheet for update)  Subjective functional status/changes:   [] No changes reported  \"It doesn't hurt, but it feels tired. \"    OBJECTIVE    Modality rationale:    Min Type Additional Details    [] Estim:  []Unatt       []IFC  []Premod                        []Other:  []w/ice   []w/heat  Position:  Location:    [] Estim: []Att    []TENS instruct  []NMES                    []Other:  []w/US   []w/ice   []w/heat  Position:  Location:    []  Traction: [] Cervical       []Lumbar                       [] Prone          []Supine                       []Intermittent   []Continuous Lbs:  [] before manual  [] after manual    []  Ultrasound: []Continuous   [] Pulsed                           []1MHz   []3MHz W/cm2:  Location:    []  Iontophoresis with dexamethasone         Location: [] Take home patch   [] In clinic    []  Ice     []  heat  []  Ice massage  []  Laser   []  Anodyne Position:  Location:    []  Laser with stim  []  Other:  Position:  Location:    []  Vasopneumatic Device Pressure:       [] lo [] med [] hi   Temperature: [] lo [] med [] hi   [] Skin assessment post-treatment:  []intact []redness- no adverse reaction    []redness - adverse reaction:      min []Eval                  []Re-Eval       44 min Therapeutic Exercise:  [x] See flow sheet :  Added wall pushups (waist level), added half prone serratus anterior presses, added serratus anterior presses making circles with ball on wall,  Added wall washing using 1.5 lbs. load   Rationale: increase ROM and increase strength to improve the patients ability to use left shoulder without pain in ADLs and above shoulder UE activities. min Therapeutic Activity:  []  See flow sheet :         min Neuromuscular Re-education:  []  See flow sheet :        min Manual Therapy:          min Gait Training:  ___ feet with ___ device on level surfaces with ___ level of assist   Rationale: With   [] TE   [] TA   [] neuro   [] other: Patient Education: [x] Review HEP    [] Progressed/Changed HEP based on:   [] positioning   [] body mechanics   [] transfers   [] heat/ice application    [] other:      Other Objective/Functional Measures:   No objective measures taken today. Pain Level (0-10 scale) post treatment: 0/10    ASSESSMENT/Changes in Function:    Pt reported no increase of pain during exercises and ADLs. Pt's strength improving, but AROM still not WNL. Patient will continue to benefit from skilled PT services to modify and progress therapeutic interventions, address ROM deficits, address strength deficits, analyze and address soft tissue restrictions, analyze and cue movement patterns, analyze and modify body mechanics/ergonomics, assess and modify postural abnormalities and instruct in home and community integration to attain remaining goals.      [x]  See Plan of Care  []  See progress note/recertification  []  See Discharge Summary      Progress towards goals / Updated goals:                        1) Goal: Pt will be independent and compliant with HEP to achieve other goals.   Status at initial evaluation: pt is not independent with exercises  Status at last progress note (7/18/17): progressing (pt is over 75% independent with exercises)  Current status: not reassessed                            2) Goal: Pt's AROM/PROM of left shoulder will increase by 5-10 degrees per week in order to prepare for strengthening and improve ADLs. Status at initial evaluation: AROM of shoulder flexion: 126 degrees left, 141 degrees right, shoulder extension: 56 degrees left, 57 degrees right, scaption: 138 degrees left, 141 degrees right, abduction: 100 degrees left, 139 degrees right, ER: crown of head left, C2 level right, IR: L1 level left, iliac crest right; PROM of left shoulder flexion: 152 degrees left, scaption: 180 degrees, abduction: 169 degrees, ER: 90 degrees, IR: 57 degrees  Status at last progress note (7/18/17):  met for AROM, but not met for PROM (AROM/PROM shoulder flexion: 159 degrees (improved 33 degrees)/ 150 degrees (decreased 2 degrees), Abduction: 157 degrees (improved 57 degrees)/165 degrees (decreased 4 degrees), Scaption: 142 degrees with pain (improved 4 degrees)/180 degrees (no change), ER: C6 level/ (improved 8 inches)/74 degrees (decreased 16 degrees), IR: T10 level (improved 3 levels)/68 degrees (improved 11 degrees))  Current status: not reassessed                            3) Goal: Increase strength of left shoulder by 1/3 grade in order to allow pt to reach overhead cabinet and lift up to 3 lbs. without increased pain. Status at initial evaluation: strength of UEs grossly 4/5 to 5/5 bilateral UEs with pain during resisted shoulder flexion bilaterally, abduction bilaterally, and IR on left only  Status at last progress note (7/18/17):  met for shoulder ER only (Strength left shoulder flexion: 4/5 no pain (no change), Shoulder extension: 5/5 (no change), Abduction: 4+/5 (no change), ER: 4+/5 (improved 1/3 grade), IR: 4+/5 (worsened 1/3 grade), Elbow flexion: 5/5 (no change), Elbow extension: 5/5 (no change))  Current status: not reassessed                            4) Decrease pain in left shoulder to 4/10 at worst during ADLs in order to allow pt to sleep at least 5 hours straight.   Status at initial evaluation: pain 7/10 at worst  Status at last progress note (7/18/17):  progressing (pain 5-6/10 in the ranges of abd, ER and abd with ER)  Current status: not reassessed      Long Term Goals (To be completed in 8 weeks)                        3) Goal: Increase AROM/PROM of left shoulder to >90% of normal in all directions without increased pain in order to return to normal function. Status at initial evaluation:  AROM of shoulder flexion: 126 degrees left, 141 degrees right, shoulder extension: 56 degrees left, 57 degrees right, scaption: 138 degrees left, 141 degrees right, abduction: 100 degrees left, 139 degrees right, ER: crown of head left, C2 level right, IR: L1 level left, iliac crest right  Status at last progress note (7/18/17):  AROM shoulder flexion: 159 degrees (improved 33 degrees), Abduction: 157 degrees (improved 57 degrees), Scaption: 142 degrees with pain (improved 4 degrees), ER: C6 level/ (improved 8 inches), IR: T10 level (improved 3 levels))  Current status: not reassessed                            2) Goal: Increase strength of left shoulder to 5/5 all muscle groups without increased pain in order to return to normal function. Status at initial evaluation: strength of UEs grossly 4/5 to 5/5 bilateral UEs with pain during resisted shoulder flexion bilaterally, abduction bilaterally, and IR on left only  Status at last progress note (7/18/17):  progressing (Strength left shoulder flexion: 4/5 no pain (no change), Shoulder extension: 5/5 (no change), Abduction: 4+/5 (no change), ER: 4+/5 (improved 1/3 grade), IR: 4+/5 (worsened 1/3 grade), Elbow flexion: 5/5 (no change), Elbow extension: 5/5 (no change))  Current status: not reassessed                            3) Goal: Decrease pain in left shoulder to 2/10 at worst during ADLs in order to allow pt to sleep at least 8 hours straight and normalize function.   Status at initial evaluation: pain 7/10 at worst  Status at last progress note (7/18/17): progressing (pain is 0/10 at rest; 5-6/10 with movement and loading of left shoulder)  Current status: not reassessed     PLAN  []  Upgrade activities as tolerated     [x]  Continue plan of care  []  Update interventions per flow sheet       []  Discharge due to:_  []  Other:_      Bennie Shaffer PT 8/1/2017  10:49 AM    Future Appointments  Date Time Provider Jan Gaines   8/3/2017 10:30 AM ISI Zheng [facility]   8/8/2017 10:30 AM Hershal Litten V, PT [facility]   8/10/2017 10:30 AM ISI DuttaHPNATHAN [facility]   8/15/2017 10:30 AM Hershal Litten V, PT [facility]   8/17/2017 10:30 AM ISI ZhengHPNATHAN [facility]

## 2017-08-03 ENCOUNTER — APPOINTMENT (OUTPATIENT)
Dept: PHYSICAL THERAPY | Age: 72
End: 2017-08-03
Payer: MEDICARE

## 2017-08-08 ENCOUNTER — HOSPITAL ENCOUNTER (OUTPATIENT)
Dept: PHYSICAL THERAPY | Age: 72
Discharge: HOME | End: 2017-08-08
Payer: MEDICARE

## 2017-08-08 PROCEDURE — 97110 THERAPEUTIC EXERCISES: CPT

## 2017-08-10 ENCOUNTER — HOSPITAL ENCOUNTER (OUTPATIENT)
Dept: PHYSICAL THERAPY | Age: 72
Discharge: HOME | End: 2017-08-10
Payer: MEDICARE

## 2017-08-10 PROCEDURE — 97530 THERAPEUTIC ACTIVITIES: CPT

## 2017-08-10 PROCEDURE — 97110 THERAPEUTIC EXERCISES: CPT

## 2017-08-10 NOTE — PROGRESS NOTES
PT DAILY TREATMENT NOTE - [facility]     Patient Name: Mike Silva  Date:8/10/2017  : 1945  [x]  Patient  Verified  Payor: [place] MED[place] / Plan: [place] MEDICARE PART A & B / Product Type: Medicare /    In time:10:40  Out time:11:30  Total Treatment Time (min): 50  Total Timed Codes (min): 50  1:1 Treatment Time ( [address] only): 40   Visit #: 13 of 18    Treatment Area: Pain in shoulder region, left [M25.512]    SUBJECTIVE  Pain Level (0-10 scale): 0/10  Any medication changes, allergies to medications, adverse drug reactions, diagnosis change, or new procedure performed?: [x] No    [] Yes (see summary sheet for update)  Subjective functional status/changes:   [] No changes reported  \" still having some difficulty putting plates in the upper cabinets. \"    OBJECTIVE    Modality rationale:    Min Type Additional Details    [] Estim:  []Unatt       []IFC  []Premod                        []Other:  []w/ice   []w/heat  Position:  Location:    [] Estim: []Att    []TENS instruct  []NMES                    []Other:  []w/US   []w/ice   []w/heat  Position:  Location:    []  Traction: [] Cervical       []Lumbar                       [] Prone          []Supine                       []Intermittent   []Continuous Lbs:  [] before manual  [] after manual    []  Ultrasound: []Continuous   [] Pulsed                           []1MHz   []3MHz W/cm2:  Location:    []  Iontophoresis with dexamethasone         Location: [] Take home patch   [] In clinic    []  Ice     []  heat  []  Ice massage  []  Laser   []  Anodyne Position:  Location:    []  Laser with stim  []  Other:  Position:  Location:    []  Vasopneumatic Device Pressure:       [] lo [] med [] hi   Temperature: [] lo [] med [] hi   [] Skin assessment post-treatment:  []intact []redness- no adverse reaction    []redness - adverse reaction:      min []Eval                  []Re-Eval       30 min Therapeutic Exercise:  [x] See flow sheet :focus on closed chain activities for [facility] strength   Rationale: increase ROM and increase strength to improve the patients ability to use left shoulder without pain in ADLs and above shoulder UE activities. 20 min Therapeutic Activity:  [x]  See flow sheet :muscle reeducation for improved scapular stability during UE reaching         min Neuromuscular Re-education:  []  See flow sheet :        min Manual Therapy:          min Gait Training:  ___ feet with ___ device on level surfaces with ___ level of assist   Rationale: With   [] TE   [] TA   [] neuro   [] other: Patient Education: [x] Review HEP    [] Progressed/Changed HEP based on:   [] positioning   [] body mechanics   [] transfers   [] heat/ice application    [] other:      Other Objective/Functional Measures:   Unable to perform ex in QP due to bilateral TKA  Fatigue with sustained WB on left UE and RC activities  Challenged with shoulder and chest press/unilaterally   FOTO 64/100    Pain Level (0-10 scale) post treatment: 0/10    ASSESSMENT/Changes in Function:   Steady improvement in function, residual deficits in scapular stability     Patient will continue to benefit from skilled PT services to modify and progress therapeutic interventions, address ROM deficits, address strength deficits, analyze and address soft tissue restrictions, analyze and cue movement patterns, analyze and modify body mechanics/ergonomics, assess and modify postural abnormalities and instruct in home and community integration to attain remaining goals.      [x]  See Plan of Care  []  See progress note/recertification  []  See Discharge Summary      Progress towards goals / Updated goals:                        9) Goal: Pt will be independent and compliant with HEP to achieve other goals.   Status at initial evaluation: pt is not independent with exercises  Status at last progress note (7/18/17): progressing (pt is over 75% independent with exercises)  Current status: not reassessed                            2) Goal: Pt's AROM/PROM of left shoulder will increase by 5-10 degrees per week in order to prepare for strengthening and improve ADLs. Status at initial evaluation: AROM of shoulder flexion: 126 degrees left, 141 degrees right, shoulder extension: 56 degrees left, 57 degrees right, scaption: 138 degrees left, 141 degrees right, abduction: 100 degrees left, 139 degrees right, ER: crown of head left, C2 level right, IR: L1 level left, iliac crest right; PROM of left shoulder flexion: 152 degrees left, scaption: 180 degrees, abduction: 169 degrees, ER: 90 degrees, IR: 57 degrees  Status at last progress note (7/18/17):  met for AROM, but not met for PROM (AROM/PROM shoulder flexion: 159 degrees (improved 33 degrees)/ 150 degrees (decreased 2 degrees), Abduction: 157 degrees (improved 57 degrees)/165 degrees (decreased 4 degrees), Scaption: 142 degrees with pain (improved 4 degrees)/180 degrees (no change), ER: C6 level/ (improved 8 inches)/74 degrees (decreased 16 degrees), IR: T10 level (improved 3 levels)/68 degrees (improved 11 degrees))  Current status: not reassessed                            3) Goal: Increase strength of left shoulder by 1/3 grade in order to allow pt to reach overhead cabinet and lift up to 3 lbs. without increased pain. Status at initial evaluation: strength of UEs grossly 4/5 to 5/5 bilateral UEs with pain during resisted shoulder flexion bilaterally, abduction bilaterally, and IR on left only  Status at last progress note (7/18/17):  met for shoulder ER only (Strength left shoulder flexion: 4/5 no pain (no change), Shoulder extension: 5/5 (no change), Abduction: 4+/5 (no change), ER: 4+/5 (improved 1/3 grade), IR: 4+/5 (worsened 1/3 grade), Elbow flexion: 5/5 (no change), Elbow extension: 5/5 (no change))  Current status: not reassessed                            4) Decrease pain in left shoulder to 4/10 at worst during ADLs in order to allow pt to sleep at least 5 hours straight.   Status at initial evaluation: pain 7/10 at worst  Status at last progress note (7/18/17):  progressing (pain 5-6/10 in the ranges of abd, ER and abd with ER)  Current status: not reassessed      Long Term Goals (To be completed in 8 weeks)                        4) Goal: Increase AROM/PROM of left shoulder to >90% of normal in all directions without increased pain in order to return to normal function. Status at initial evaluation:  AROM of shoulder flexion: 126 degrees left, 141 degrees right, shoulder extension: 56 degrees left, 57 degrees right, scaption: 138 degrees left, 141 degrees right, abduction: 100 degrees left, 139 degrees right, ER: crown of head left, C2 level right, IR: L1 level left, iliac crest right  Status at last progress note (7/18/17):  AROM shoulder flexion: 159 degrees (improved 33 degrees), Abduction: 157 degrees (improved 57 degrees), Scaption: 142 degrees with pain (improved 4 degrees), ER: C6 level/ (improved 8 inches), IR: T10 level (improved 3 levels))  Current status: not reassessed                            2) Goal: Increase strength of left shoulder to 5/5 all muscle groups without increased pain in order to return to normal function. Status at initial evaluation: strength of UEs grossly 4/5 to 5/5 bilateral UEs with pain during resisted shoulder flexion bilaterally, abduction bilaterally, and IR on left only  Status at last progress note (7/18/17):  progressing (Strength left shoulder flexion: 4/5 no pain (no change), Shoulder extension: 5/5 (no change), Abduction: 4+/5 (no change), ER: 4+/5 (improved 1/3 grade), IR: 4+/5 (worsened 1/3 grade), Elbow flexion: 5/5 (no change), Elbow extension: 5/5 (no change))  Current status: not reassessed                            3) Goal: Decrease pain in left shoulder to 2/10 at worst during ADLs in order to allow pt to sleep at least 8 hours straight and normalize function.   Status at initial evaluation: pain 7/10 at worst  Status at last progress note (7/18/17): progressing (pain is 0/10 at rest; 5-6/10 with movement and loading of left shoulder)  Current status: not reassessed    PLAN  []  Upgrade activities as tolerated     [x]  Continue plan of care  []  Update interventions per flow sheet       []  Discharge due to:_  []  Other:_      Sandra Mosher PT 8/10/2017  11:11 AM    Future Appointments  Date Time Provider Jan Gaines   8/15/2017 10:30 AM ISI Vidal Chi [facility]   8/17/2017 10:30 AM ISI Vidal Chi [facility]

## 2017-08-15 ENCOUNTER — HOSPITAL ENCOUNTER (OUTPATIENT)
Dept: PHYSICAL THERAPY | Age: 72
Discharge: HOME | End: 2017-08-15
Payer: MEDICARE

## 2017-08-15 PROCEDURE — 97164 PT RE-EVAL EST PLAN CARE: CPT

## 2017-08-15 PROCEDURE — G8988 SELF CARE GOAL STATUS: HCPCS

## 2017-08-15 PROCEDURE — 97110 THERAPEUTIC EXERCISES: CPT

## 2017-08-15 PROCEDURE — G8987 SELF CARE CURRENT STATUS: HCPCS

## 2017-08-15 NOTE — PROGRESS NOTES
PT DAILY TREATMENT NOTE - [facility]     Patient Name: Crista Morin  Date:8/15/2017  : 1945  [x]  Patient  Verified  Payor: [place] MEDICARE / Plan: [place] MEDICARE PART A & B / Product Type: Medicare /    In time:10:35  Out time:11:17  Total Treatment Time (min): 42  Total Timed Codes (min): 27  1:1 Treatment Time ( [address] only): 26   Visit #: 15 of 18    Treatment Area: Pain in shoulder region, left [M25.512]    SUBJECTIVE  Pain Level (0-10 scale): 0/10  Any medication changes, allergies to medications, adverse drug reactions, diagnosis change, or new procedure performed?: [x] No    [] Yes (see summary sheet for update)  Subjective functional status/changes:   [] No changes reported  \"I can now lift 1 dinner plate and 3 salad plates to my first overhead shelf and 1 salad bowl to the second overhead shelf. I;m doing about 75% of my normal activity.   Pain 0/10 at worst\"    OBJECTIVE    Modality rationale:    Min Type Additional Details    [] Estim:  []Unatt       []IFC  []Premod                        []Other:  []w/ice   []w/heat  Position:  Location:    [] Estim: []Att    []TENS instruct  []NMES                    []Other:  []w/US   []w/ice   []w/heat  Position:  Location:    []  Traction: [] Cervical       []Lumbar                       [] Prone          []Supine                       []Intermittent   []Continuous Lbs:  [] before manual  [] after manual    []  Ultrasound: []Continuous   [] Pulsed                           []1MHz   []3MHz W/cm2:  Location:    []  Iontophoresis with dexamethasone         Location: [] Take home patch   [] In clinic    []  Ice     []  heat  []  Ice massage  []  Laser   []  Anodyne Position:  Location:    []  Laser with stim  []  Other:  Position:  Location:    []  Vasopneumatic Device Pressure:       [] lo [] med [] hi   Temperature: [] lo [] med [] hi   [] Skin assessment post-treatment:  []intact []redness- no adverse reaction    []redness - adverse reaction:     15 1:1 min []Eval [x]Re-Eval       27 total  11 1:1 min Therapeutic Exercise:  [x] See flow sheet :   Rationale: increase ROM and increase strength to improve the patients ability to use left shoulder without pain in ADLs and above shoulder UE activities. min Therapeutic Activity:  []  See flow sheet :         min Neuromuscular Re-education:  []  See flow sheet :        min Manual Therapy:          min Gait Training:  ___ feet with ___ device on level surfaces with ___ level of assist   Rationale: With   [] TE   [] TA   [] neuro   [] other: Patient Education: [x] Review HEP    [] Progressed/Changed HEP based on:   [] positioning   [] body mechanics   [] transfers   [] heat/ice application    [] other:      Other Objective/Functional Measures:   AROM left shoulder flexion: 159 degrees (improved 33 degrees)  Shoulder extension: 58 degrees (improved 2 degrees)  Abduction: 144 degrees (improved 44 degrees)  Scaption: 155 degrees with pain (improved 17 degrees)  ER: C2-level (improved 6 inches)  IR: T11-level (improved 2 levels)  PROM left shoulder flexion: 162 degrees (improved 10 degrees)  Abduction: 166 degrees (decreased 3 degrees)  Scaption: 180 degrees (no change)  ER: 76 degrees (decreased 14 degrees)  IR: 72 degrees (improved 15 degrees)  Strength left shoulder flexion: 4/5 (no change)  Shoulder extension: 5/5 (no change)  Abduction: 5/5 (improved 1/3 grade)  ER: 4+/5 (improved 1/3 grade)  IR: 5/5 (no change)  Elbow flexors: 5/5 (no change)  Elbow extensors: 5/5 (no change)    Pain Level (0-10 scale) post treatment: 0/10    ASSESSMENT/Changes in Function:    Pt has shown steady improvement in shoulder strength, PROM, AROM, and function. Pt reports being able to lift a dinner plate to the first overhead shelf in her kitchen. Pt reports abolished pain during all ADLs. Pt's strength of left UE is grossly 5/5 all muscle groups except shoulder flexion (4/5) and ER (4+/5).   Pt reports returning to about 75% of prior level of function, but would like to strength her shoulders/UEs more. Pt may benefit from additional skilled PT to emphasize strengthening and functional activities that she could d at [facility] without going to local gym. Patient will continue to benefit from skilled PT services to modify and progress therapeutic interventions, address ROM deficits, address strength deficits, analyze and address soft tissue restrictions, analyze and cue movement patterns, analyze and modify body mechanics/ergonomics, assess and modify postural abnormalities and instruct in home and community integration to attain remaining goals. []  See Plan of Care  [x]  See progress note/recertification  []  See Discharge Summary      Progress towards goals / Updated goals:                        4) Goal: Pt will be independent and compliant with HEP to achieve other goals. Status at initial evaluation: pt is not independent with exercises  Status at last progress note (7/18/17): progressing (pt is over 75% independent with exercises)  Current status: progressing (pt is over 90% independent with current exercises.) 8/15/17                            9) Goal: Pt's AROM/PROM of left shoulder will increase by 5-10 degrees per week in order to prepare for strengthening and improve ADLs.   Status at initial evaluation: AROM of shoulder flexion: 126 degrees left, 141 degrees right, shoulder extension: 56 degrees left, 57 degrees right, scaption: 138 degrees left, 141 degrees right, abduction: 100 degrees left, 139 degrees right, ER: crown of head left, C2 level right, IR: L1 level left, iliac crest right; PROM of left shoulder flexion: 152 degrees left, scaption: 180 degrees, abduction: 169 degrees, ER: 90 degrees, IR: 57 degrees  Status at last progress note (7/18/17):  met for AROM, but not met for PROM (AROM/PROM shoulder flexion: 159 degrees (improved 33 degrees)/ 150 degrees (decreased 2 degrees), Abduction: 157 degrees (improved 57 degrees)/165 degrees (decreased 4 degrees), Scaption: 142 degrees with pain (improved 4 degrees)/180 degrees (no change), ER: C6 level/ (improved 8 inches)/74 degrees (decreased 16 degrees), IR: T10 level (improved 3 levels)/68 degrees (improved 11 degrees))  Current status: met (AROM left shoulder flexion: 159 degrees (improved 33 degrees), Shoulder extension: 58 degrees (improved 2 degrees), Abduction: 144 degrees (improved 44 degrees), Scaption: 155 degrees with pain (improved 17 degrees), ER: C2-level (improved 6 inches), IR: T11-level (improved 2 levels); PROM left shoulder flexion: 162 degrees (improved 10 degrees), Abduction: 166 degrees (decreased 3 degrees), Scaption: 180 degrees (no change), ER: 76 degrees (decreased 14 degrees), IR: 72 degrees (improved 15 degrees)) 8/15/17                            3) Goal: Increase strength of left shoulder by 1/3 grade in order to allow pt to reach overhead cabinet and lift up to 3 lbs. without increased pain. Status at initial evaluation: strength of UEs grossly 4/5 to 5/5 bilateral UEs with pain during resisted shoulder flexion bilaterally, abduction bilaterally, and IR on left only  Status at last progress note (7/18/17):  met for shoulder ER only (Strength left shoulder flexion: 4/5 no pain (no change), Shoulder extension: 5/5 (no change), Abduction: 4+/5 (no change), ER: 4+/5 (improved 1/3 grade), IR: 4+/5 (worsened 1/3 grade), Elbow flexion: 5/5 (no change), Elbow extension: 5/5 (no change))  Current status: met for all except shoulder flexion (Strength left shoulder flexion: 4/5 (no change), Shoulder extension: 5/5 (no change), Abduction: 5/5 (improved 1/3 grade), ER: 4+/5 (improved 1/3 grade), IR: 5/5 (no change), Elbow flexors: 5/5 (no change), Elbow extensors: 5/5 (no change)) 8/15/17                            4) Decrease pain in left shoulder to 4/10 at worst during ADLs in order to allow pt to sleep at least 5 hours straight.   Status at initial evaluation: pain 7/10 at worst  Status at last progress note (7/18/17):  progressing (pain 5-6/10 in the ranges of abd, ER and abd with ER)  Current status: met (pain 0/10 at worst) 8/15/17      Long Term Goals (To be completed in 8 weeks)                        1) Goal: Increase AROM/PROM of left shoulder to >90% of normal in all directions without increased pain in order to return to normal function. Status at initial evaluation:  AROM of shoulder flexion: 126 degrees left, 141 degrees right, shoulder extension: 56 degrees left, 57 degrees right, scaption: 138 degrees left, 141 degrees right, abduction: 100 degrees left, 139 degrees right, ER: crown of head left, C2 level right, IR: L1 level left, iliac crest right  Status at last progress note (7/18/17):  AROM shoulder flexion: 159 degrees (improved 33 degrees), Abduction: 157 degrees (improved 57 degrees), Scaption: 142 degrees with pain (improved 4 degrees), ER: C6 level/ (improved 8 inches), IR: T10 level (improved 3 levels))  Current status: met for PROM and progressing for AROM (AROM left shoulder flexion: 159 degrees (improved 33 degrees), Shoulder extension: 58 degrees (improved 2 degrees), Abduction: 144 degrees (improved 44 degrees), Scaption: 155 degrees with pain (improved 17 degrees), ER: C2-level (improved 6 inches), IR: T11-level (improved 2 levels); PROM left shoulder flexion: 162 degrees (improved 10 degrees), Abduction: 166 degrees (decreased 3 degrees), Scaption: 180 degrees (no change), ER: 76 degrees (decreased 14 degrees), IR: 72 degrees (improved 15 degrees)) 8/15/17                            5) Goal: Increase strength of left shoulder to 5/5 all muscle groups without increased pain in order to return to normal function.   Status at initial evaluation: strength of UEs grossly 4/5 to 5/5 bilateral UEs with pain during resisted shoulder flexion bilaterally, abduction bilaterally, and IR on left only  Status at last progress note (7/18/17):  progressing (Strength left shoulder flexion: 4/5 no pain (no change), Shoulder extension: 5/5 (no change), Abduction: 4+/5 (no change), ER: 4+/5 (improved 1/3 grade), IR: 4+/5 (worsened 1/3 grade), Elbow flexion: 5/5 (no change), Elbow extension: 5/5 (no change))  Current status: met for all except shoulder flexion and ER (Strength left shoulder flexion: 4/5 (no change), Shoulder extension: 5/5 (no change), Abduction: 5/5 (improved 1/3 grade), ER: 4+/5 (improved 1/3 grade), IR: 5/5 (no change), Elbow flexors: 5/5 (no change), Elbow extensors: 5/5 (no change)) 8/15/17                            3) Goal: Decrease pain in left shoulder to 2/10 at worst during ADLs in order to allow pt to sleep at least 8 hours straight and normalize function. Status at initial evaluation: pain 7/10 at worst  Status at last progress note (7/18/17): progressing (pain is 0/10 at rest; 5-6/10 with movement and loading of left shoulder)  Current status:  met (pain 0/10 at worst) 8/15/17    PLAN  []  Upgrade activities as tolerated     [x]  Continue plan of care  []  Update interventions per flow sheet       []  Discharge due to:_  [x]  Other: Pt may benefit from additional skilled PT to emphasize strengthening and functional activities that she could d at [facility] without going to local gym.        Chriss Cardenas V PT 8/15/2017  10:53 AM    Future Appointments  Date Time Provider Jan Gaines   8/17/2017 10:30 AM Pérez Hancock, PT [email or web address]

## 2017-08-15 NOTE — PROGRESS NOTES
[facility] at [address] Yonis Cervantes, [address] [place]  Phone: [phone] Fax: [phone]    Continued Plan of Care/ Re-certification for Physical Therapy Services    Patient name: Ender Ibarra Start of Care: 6/15/17   Referral source: Ariella Aponte MD : 1945   Medical/Treatment Diagnosis: Pain in shoulder region, left [M25.512],  left rotator cuff tear Onset Date: late 2017   Prior Hospitalization: see medical history Provider#: [id number]   Medications: Verified on Patient Summary List    Comorbidities: DM, HTN, thyroid problems, CA   Prior Level of Function: no deficits  Visits from Start of Care: 15    Missed Visits: 2    The Plan of Care and following information is based on the patient's current status:     1) Goal: Pt will be independent and compliant with HEP to achieve other goals. Status at initial evaluation: pt is not independent with exercises  Status at last progress note (17): progressing (pt is over 75% independent with exercises)  Current status: progressing (pt is over 90% independent with current exercises. )                            7) Goal: Pt's AROM/PROM of left shoulder will increase by 5-10 degrees per week in order to prepare for strengthening and improve ADLs.   Status at initial evaluation: AROM of shoulder flexion: 126 degrees left, 141 degrees right, shoulder extension: 56 degrees left, 57 degrees right, scaption: 138 degrees left, 141 degrees right, abduction: 100 degrees left, 139 degrees right, ER: crown of head left, C2 level right, IR: L1 level left, iliac crest right; PROM of left shoulder flexion: 152 degrees left, scaption: 180 degrees, abduction: 169 degrees, ER: 90 degrees, IR: 57 degrees  Status at last progress note (17):  met for AROM, but not met for PROM (AROM/PROM shoulder flexion: 159 degrees (improved 33 degrees)/ 150 degrees (decreased 2 degrees), Abduction: 157 degrees (improved 57 degrees)/165 degrees (decreased 4 degrees), Scaption: 142 degrees with pain (improved 4 degrees)/180 degrees (no change), ER: C6 level/ (improved 8 inches)/74 degrees (decreased 16 degrees), IR: T10 level (improved 3 levels)/68 degrees (improved 11 degrees))  Current status: met (AROM left shoulder flexion: 159 degrees (improved 33 degrees), Shoulder extension: 58 degrees (improved 2 degrees), Abduction: 144 degrees (improved 44 degrees), Scaption: 155 degrees with pain (improved 17 degrees), ER: C2-level (improved 6 inches), IR: T11-level (improved 2 levels); PROM left shoulder flexion: 162 degrees (improved 10 degrees), Abduction: 166 degrees (decreased 3 degrees), Scaption: 180 degrees (no change), ER: 76 degrees (decreased 14 degrees), IR: 72 degrees (improved 15 degrees))                            4) Goal: Increase strength of left shoulder by 1/3 grade in order to allow pt to reach overhead cabinet and lift up to 3 lbs. without increased pain. Status at initial evaluation: strength of UEs grossly 4/5 to 5/5 bilateral UEs with pain during resisted shoulder flexion bilaterally, abduction bilaterally, and IR on left only  Status at last progress note (7/18/17):  met for shoulder ER only (Strength left shoulder flexion: 4/5 no pain (no change), Shoulder extension: 5/5 (no change), Abduction: 4+/5 (no change), ER: 4+/5 (improved 1/3 grade), IR: 4+/5 (worsened 1/3 grade), Elbow flexion: 5/5 (no change), Elbow extension: 5/5 (no change))  Current status: met for all except shoulder flexion (Strength left shoulder flexion: 4/5 (no change), Shoulder extension: 5/5 (no change), Abduction: 5/5 (improved 1/3 grade), ER: 4+/5 (improved 1/3 grade), IR: 5/5 (no change), Elbow flexors: 5/5 (no change), Elbow extensors: 5/5 (no change))                            4) Decrease pain in left shoulder to 4/10 at worst during ADLs in order to allow pt to sleep at least 5 hours straight.   Status at initial evaluation: pain 7/10 at worst  Status at last progress note (7/18/17):  progressing (pain 5-6/10 in the ranges of abd, ER and abd with ER)  Current status: met (pain 0/10 at worst)      Long Term Goals (To be completed in 8 weeks)                        4) Goal: Increase AROM/PROM of left shoulder to >90% of normal in all directions without increased pain in order to return to normal function. Status at initial evaluation:  AROM of shoulder flexion: 126 degrees left, 141 degrees right, shoulder extension: 56 degrees left, 57 degrees right, scaption: 138 degrees left, 141 degrees right, abduction: 100 degrees left, 139 degrees right, ER: crown of head left, C2 level right, IR: L1 level left, iliac crest right  Status at last progress note (7/18/17):  AROM shoulder flexion: 159 degrees (improved 33 degrees), Abduction: 157 degrees (improved 57 degrees), Scaption: 142 degrees with pain (improved 4 degrees), ER: C6 level/ (improved 8 inches), IR: T10 level (improved 3 levels))  Current status: met for PROM and progressing for AROM (AROM left shoulder flexion: 159 degrees (improved 33 degrees), Shoulder extension: 58 degrees (improved 2 degrees), Abduction: 144 degrees (improved 44 degrees), Scaption: 155 degrees with pain (improved 17 degrees), ER: C2-level (improved 6 inches), IR: T11-level (improved 2 levels); PROM left shoulder flexion: 162 degrees (improved 10 degrees), Abduction: 166 degrees (decreased 3 degrees), Scaption: 180 degrees (no change), ER: 76 degrees (decreased 14 degrees), IR: 72 degrees (improved 15 degrees))                            2) Goal: Increase strength of left shoulder to 5/5 all muscle groups without increased pain in order to return to normal function.   Status at initial evaluation: strength of UEs grossly 4/5 to 5/5 bilateral UEs with pain during resisted shoulder flexion bilaterally, abduction bilaterally, and IR on left only  Status at last progress note (7/18/17):  progressing (Strength left shoulder flexion: 4/5 no pain (no change), Shoulder extension: 5/5 (no change), Abduction: 4+/5 (no change), ER: 4+/5 (improved 1/3 grade), IR: 4+/5 (worsened 1/3 grade), Elbow flexion: 5/5 (no change), Elbow extension: 5/5 (no change))  Current status: met for all except shoulder flexion and ER (Strength left shoulder flexion: 4/5 (no change), Shoulder extension: 5/5 (no change), Abduction: 5/5 (improved 1/3 grade), ER: 4+/5 (improved 1/3 grade), IR: 5/5 (no change), Elbow flexors: 5/5 (no change), Elbow extensors: 5/5 (no change))                            3) Goal: Decrease pain in left shoulder to 2/10 at worst during ADLs in order to allow pt to sleep at least 8 hours straight and normalize function. Status at initial evaluation: pain 7/10 at worst  Status at last progress note (7/18/17): progressing (pain is 0/10 at rest; 5-6/10 with movement and loading of left shoulder)  Current status:  met (pain 0/10 at worst)    Key functional changes:    Pt has shown steady improvement in shoulder strength, PROM, AROM, and function. Pt reports being able to lift a dinner plate to the first overhead shelf in her kitchen. Pt reports abolished pain during all ADLs. Pt's strength of left UE is grossly 5/5 all muscle groups except shoulder flexion (4/5) and ER (4+/5). Pt reports returning to about 75% of prior level of function, but would like to strength her shoulders/UEs more. Problems/ barriers to goal attainment: UE weakness     Problem List: pain affecting function, decrease ROM, decrease strength, decrease ADL/ functional abilitiies, decrease activity tolerance and decrease flexibility/ joint mobility     Treatment Plan may include any combination of the following: Therapeutic exercise, Therapeutic activities, Neuromuscular re-education, Physical agent/modality, Manual therapy and Patient education     Patient Goal (s) has been updated and includes: \"I would love to use my left arm more. \"    Goals for this certification period to be accomplished in 4 weeks:   1) Continue with unmet goals above. Frequency / Duration: Patient to be seen 2 times per week for 4 weeks:    Assessment / Recommendations:   Pt may benefit from additional skilled PT to emphasize strengthening and functional activities that she could d at [facility] without going to local gym. G-Codes (GP)  Self Care   Current  CJ= 20-39%  [id number] Goal  CJ= 20-39%    The severity rating is based on clinical judgment and the FOTO score. Certification Period: 8/14/17 to 9/12/17    Alberta Denver, PT 8/15/2017 7:05 PM    ________________________________________________________________________  I certify that the above Therapy Services are being furnished while the patient is under my care. I agree with the treatment plan and certify that this therapy is necessary. [] I have read the above and request that my patient continue as recommended. [] I have read the above report and request that my patient continue therapy with the following changes/special instructions: _______________________________________  [] I have read the above report and request that my patient be discharged from therapy    Physician's Signature:________________________________Date:___________Time:__________    Please sign and return to [facility] at [facility].  Jose A Cervantes, [facility] [address]  Phone: [phone] Fax: [phone]

## 2017-08-17 ENCOUNTER — HOSPITAL ENCOUNTER (OUTPATIENT)
Dept: PHYSICAL THERAPY | Age: 72
Discharge: HOME | End: 2017-08-17
Payer: MEDICARE

## 2017-08-17 PROCEDURE — 97110 THERAPEUTIC EXERCISES: CPT | Performed by: PHYSICAL THERAPIST

## 2017-08-17 NOTE — PROGRESS NOTES
PT DAILY TREATMENT NOTE - [facility]     Patient Name: Celestine Stroud  Date:2017  : 1945  [x]  Patient  Verified  Payor: Laura Carlosdington / Plan: [place] / Product Type: Medicare /    In time:10:30  Out time:11:05  Total Treatment Time (min): 35  Total Timed Codes (min): 35  1:1 Treatment Time ( [address] only): 35   Visit #: 16 of 18    Treatment Area: Pain in shoulder region, left [M25.512]    SUBJECTIVE  Pain Level (0-10 scale): 0  Any medication changes, allergies to medications, adverse drug reactions, diagnosis change, or new procedure performed?: [x] No    [] Yes (see summary sheet for update)  Subjective functional status/changes:   [] No changes reported  Feels good. Just weak. Has difficulty lifting over head. OBJECTIVE    35 min Therapeutic Exercise:  [x] See flow sheet :   Rationale: increase ROM and increase strength to improve the patients ability to complete ADLs            With   [] TE   [] TA   [] neuro   [] other: Patient Education: [x] Review HEP    [] Progressed/Changed HEP based on:   [] positioning   [] body mechanics   [] transfers   [] heat/ice application    [] other:        Pain Level (0-10 scale) post treatment: 0/10    ASSESSMENT/Changes in Function: Patient responds well to treatment session. Is challenged by ER exercise. Has minimal difficulty with D2 flex in supine with YTB. Progress as tolerated. No adverse effects were noted from today's treatment session      Patient will continue to benefit from skilled PT services to modify and progress therapeutic interventions, address functional mobility deficits, address ROM deficits, address strength deficits, analyze and address soft tissue restrictions and analyze and cue movement patterns to attain remaining goals.      [x]  See Plan of Care  []  See progress note/recertification  []  See Discharge Summary         Progress towards goals / Updated goals:                  1) Goal: Pt will be independent and compliant with HEP to achieve other goals. Status at initial evaluation: pt is not independent with exercises  Status at last progress note (7/18/17): progressing (pt is over 75% independent with exercises)  Current status: progressing (pt is over 90% independent with current exercises. )                            8) Goal: Pt's AROM/PROM of left shoulder will increase by 5-10 degrees per week in order to prepare for strengthening and improve ADLs.   Status at initial evaluation: AROM of shoulder flexion: 126 degrees left, 141 degrees right, shoulder extension: 56 degrees left, 57 degrees right, scaption: 138 degrees left, 141 degrees right, abduction: 100 degrees left, 139 degrees right, ER: crown of head left, C2 level right, IR: L1 level left, iliac crest right; PROM of left shoulder flexion: 152 degrees left, scaption: 180 degrees, abduction: 169 degrees, ER: 90 degrees, IR: 57 degrees  Status at last progress note (7/18/17):  met for AROM, but not met for PROM (AROM/PROM shoulder flexion: 159 degrees (improved 33 degrees)/ 150 degrees (decreased 2 degrees), Abduction: 157 degrees (improved 57 degrees)/165 degrees (decreased 4 degrees), Scaption: 142 degrees with pain (improved 4 degrees)/180 degrees (no change), ER: C6 level/ (improved 8 inches)/74 degrees (decreased 16 degrees), IR: T10 level (improved 3 levels)/68 degrees (improved 11 degrees))  Current status: met (AROM left shoulder flexion: 159 degrees (improved 33 degrees), Shoulder extension: 58 degrees (improved 2 degrees), Abduction: 144 degrees (improved 44 degrees), Scaption: 155 degrees with pain (improved 17 degrees), ER: C2-level (improved 6 inches), IR: T11-level (improved 2 levels); PROM left shoulder flexion: 162 degrees (improved 10 degrees), Abduction: 166 degrees (decreased 3 degrees), Scaption: 180 degrees (no change), ER: 76 degrees (decreased 14 degrees), IR: 72 degrees (improved 15 degrees))                            3) Goal: Increase strength of left shoulder by 1/3 grade in order to allow pt to reach overhead cabinet and lift up to 3 lbs. without increased pain. Status at initial evaluation: strength of UEs grossly 4/5 to 5/5 bilateral UEs with pain during resisted shoulder flexion bilaterally, abduction bilaterally, and IR on left only  Status at last progress note (7/18/17):  met for shoulder ER only (Strength left shoulder flexion: 4/5 no pain (no change), Shoulder extension: 5/5 (no change), Abduction: 4+/5 (no change), ER: 4+/5 (improved 1/3 grade), IR: 4+/5 (worsened 1/3 grade), Elbow flexion: 5/5 (no change), Elbow extension: 5/5 (no change))  Current status: met for all except shoulder flexion (Strength left shoulder flexion: 4/5 (no change), Shoulder extension: 5/5 (no change), Abduction: 5/5 (improved 1/3 grade), ER: 4+/5 (improved 1/3 grade), IR: 5/5 (no change), Elbow flexors: 5/5 (no change), Elbow extensors: 5/5 (no change))                            4) Decrease pain in left shoulder to 4/10 at worst during ADLs in order to allow pt to sleep at least 5 hours straight. Status at initial evaluation: pain 7/10 at worst  Status at last progress note (7/18/17):  progressing (pain 5-6/10 in the ranges of abd, ER and abd with ER)  Current status: met (pain 0/10 at worst)      Long Term Goals (To be completed in 8 weeks)                        7) Goal: Increase AROM/PROM of left shoulder to >90% of normal in all directions without increased pain in order to return to normal function.   Status at initial evaluation:  AROM of shoulder flexion: 126 degrees left, 141 degrees right, shoulder extension: 56 degrees left, 57 degrees right, scaption: 138 degrees left, 141 degrees right, abduction: 100 degrees left, 139 degrees right, ER: crown of head left, C2 level right, IR: L1 level left, iliac crest right  Status at last progress note (7/18/17):  AROM shoulder flexion: 159 degrees (improved 33 degrees), Abduction: 157 degrees (improved 57 degrees), Scaption: 142 degrees with pain (improved 4 degrees), ER: C6 level/ (improved 8 inches), IR: T10 level (improved 3 levels))  Current status: met for PROM and progressing for AROM (AROM left shoulder flexion: 159 degrees (improved 33 degrees), Shoulder extension: 58 degrees (improved 2 degrees), Abduction: 144 degrees (improved 44 degrees), Scaption: 155 degrees with pain (improved 17 degrees), ER: C2-level (improved 6 inches), IR: T11-level (improved 2 levels); PROM left shoulder flexion: 162 degrees (improved 10 degrees), Abduction: 166 degrees (decreased 3 degrees), Scaption: 180 degrees (no change), ER: 76 degrees (decreased 14 degrees), IR: 72 degrees (improved 15 degrees))                            1) Goal: Increase strength of left shoulder to 5/5 all muscle groups without increased pain in order to return to normal function. Status at initial evaluation: strength of UEs grossly 4/5 to 5/5 bilateral UEs with pain during resisted shoulder flexion bilaterally, abduction bilaterally, and IR on left only  Status at last progress note (7/18/17):  progressing (Strength left shoulder flexion: 4/5 no pain (no change), Shoulder extension: 5/5 (no change), Abduction: 4+/5 (no change), ER: 4+/5 (improved 1/3 grade), IR: 4+/5 (worsened 1/3 grade), Elbow flexion: 5/5 (no change), Elbow extension: 5/5 (no change))  Current status: met for all except shoulder flexion and ER (Strength left shoulder flexion: 4/5 (no change), Shoulder extension: 5/5 (no change), Abduction: 5/5 (improved 1/3 grade), ER: 4+/5 (improved 1/3 grade), IR: 5/5 (no change), Elbow flexors: 5/5 (no change), Elbow extensors: 5/5 (no change))                            3) Goal: Decrease pain in left shoulder to 2/10 at worst during ADLs in order to allow pt to sleep at least 8 hours straight and normalize function.   Status at initial evaluation: pain 7/10 at worst  Status at last progress note (7/18/17): progressing (pain is 0/10 at rest; 5-6/10 with movement and loading of left shoulder)  Current status:  met (pain 0/10 at worst)       PLAN  []  Upgrade activities as tolerated     [x]  Continue plan of care  []  Update interventions per flow sheet       []  Discharge due to:_  []  Other:_      Sammi Mt, PT, DPT 8/17/2017  10:35 AM    Future Appointments  Date Time Provider Jan Liana   8/22/2017 11:00 AM [facility]   8/25/2017 11:00 AM Marion mayers, PT [facility]   8/29/2017 10:30 AM [facility]iz[facility]   9/1/2017 10:30 AM Juan Carlos Mckinney, PT [facility]

## 2017-08-22 ENCOUNTER — HOSPITAL ENCOUNTER (OUTPATIENT)
Dept: PHYSICAL THERAPY | Age: 72
Discharge: HOME | End: 2017-08-22
Payer: MEDICARE

## 2017-08-22 PROCEDURE — 97110 THERAPEUTIC EXERCISES: CPT

## 2017-08-22 NOTE — PROGRESS NOTES
PT DAILY TREATMENT NOTE - [facility]     Patient Name: Jericho See  Date:2017  : 1945  [x]  Patient  Verified  Payor: [place] MEDICARE / Plan: [place] MEDICARE PART A & B / Product Type: Medicare /    In time:11:05  Out time:11:38  Total Treatment Time (min): 33  Total Timed Codes (min): 33  1:1 Treatment Time ([address] only): 28   Visit #: 17 of 23    Treatment Area: Pain in shoulder region, left [M25.512]    SUBJECTIVE  Pain Level (0-10 scale): 0/10  Any medication changes, allergies to medications, adverse drug reactions, diagnosis change, or new procedure performed?: [x] No    [] Yes (see summary sheet for update)  Subjective functional status/changes:   [x] No changes reported    OBJECTIVE    Modality rationale:    Min Type Additional Details    [] Estim:  []Unatt       []IFC  []Premod                        []Other:  []w/ice   []w/heat  Position:  Location:    [] Estim: []Att    []TENS instruct  []NMES                    []Other:  []w/US   []w/ice   []w/heat  Position:  Location:    []  Traction: [] Cervical       []Lumbar                       [] Prone          []Supine                       []Intermittent   []Continuous Lbs:  [] before manual  [] after manual    []  Ultrasound: []Continuous   [] Pulsed                           []1MHz   []3MHz W/cm2:  Location:    []  Iontophoresis with dexamethasone         Location: [] Take home patch   [] In clinic    []  Ice     []  heat  []  Ice massage  []  Laser   []  Anodyne Position:  Location:    []  Laser with stim  []  Other:  Position:  Location:    []  Vasopneumatic Device Pressure:       [] lo [] med [] hi   Temperature: [] lo [] med [] hi   [] Skin assessment post-treatment:  []intact []redness- no adverse reaction    []redness - adverse reaction:      min []Eval                  []Re-Eval       33 total  28 1:1 min Therapeutic Exercise:  [x] See flow sheet :  Added body blade, added supine shoulder flexion using red theraband   Rationale: increase ROM and increase strength to improve the patients ability to use left shoulder without pain in ADLs and above shoulder UE activities. min Therapeutic Activity:  []  See flow sheet :         min Neuromuscular Re-education:  []  See flow sheet :        min Manual Therapy:          min Gait Training:  ___ feet with ___ device on level surfaces with ___ level of assist   Rationale: With   [] TE   [] TA   [] neuro   [] other: Patient Education: [x] Review HEP    [] Progressed/Changed HEP based on:   [] positioning   [] body mechanics   [] transfers   [] heat/ice application    [] other:      Other Objective/Functional Measures:   No objective measures taken today. Pain Level (0-10 scale) post treatment: 0/10    ASSESSMENT/Changes in Function:    Pt's shoulder strength and function improving and slowly normalizing. Patient will continue to benefit from skilled PT services to modify and progress therapeutic interventions, address functional mobility deficits, address ROM deficits, address strength deficits, analyze and address soft tissue restrictions and analyze and cue movement patterns to attain remaining goals. []  See Plan of Care  []  See progress note/recertification  []  See Discharge Summary         Progress towards goals / Updated goals:   1) Goal: Pt will be independent and compliant with HEP to achieve other goals. Status at initial evaluation: pt is not independent with exercises  Status at last progress note (8/15/17): progressing (pt is over 90% independent with current exercises.)  Current status: not reassessed       2) Goal: Pt's AROM/PROM of left shoulder will increase by 5-10 degrees per week in order to prepare for strengthening and improve ADLs.   Status at initial evaluation: AROM of shoulder flexion: 126 degrees left, 141 degrees right, shoulder extension: 56 degrees left, 57 degrees right, scaption: 138 degrees left, 141 degrees right, abduction: 100 degrees left, 139 degrees right, ER: crown of head left, C2 level right, IR: L1 level left, iliac crest right; PROM of left shoulder flexion: 152 degrees left, scaption: 180 degrees, abduction: 169 degrees, ER: 90 degrees, IR: 57 degrees  Status at last progress note (8/15/17): met (AROM left shoulder flexion: 159 degrees (improved 33 degrees), Shoulder extension: 58 degrees (improved 2 degrees), Abduction: 144 degrees (improved 44 degrees), Scaption: 155 degrees with pain (improved 17 degrees), ER: C2-level (improved 6 inches), IR: T11-level (improved 2 levels); PROM left shoulder flexion: 162 degrees (improved 10 degrees), Abduction: 166 degrees (decreased 3 degrees), Scaption: 180 degrees (no change), ER: 76 degrees (decreased 14 degrees), IR: 72 degrees (improved 15 degrees))  Current status: not reassessed       3) Goal: Increase strength of left shoulder by 1/3 grade in order to allow pt to reach overhead cabinet and lift up to 3 lbs. without increased pain. Status at initial evaluation: strength of UEs grossly 4/5 to 5/5 bilateral UEs with pain during resisted shoulder flexion bilaterally, abduction bilaterally, and IR on left only  Status at last progress note (8/15/17):  met for all except shoulder flexion (Strength left shoulder flexion: 4/5 (no change), Shoulder extension: 5/5 (no change), Abduction: 5/5 (improved 1/3 grade), ER: 4+/5 (improved 1/3 grade), IR: 5/5 (no change), Elbow flexors: 5/5 (no change), Elbow extensors: 5/5 (no change))  Current status: not reassessed       4) Decrease pain in left shoulder to 4/10 at worst during ADLs in order to allow pt to sleep at least 5 hours straight.   Status at initial evaluation: pain 7/10 at worst  Status at last progress note (8/15/17): met (pain 0/10 at worst)  Current status: not reassessed      Long Term Goals (To be completed in 8 weeks)   1) Goal: Increase AROM/PROM of left shoulder to >90% of normal in all directions without increased pain in order to return to normal function. Status at initial evaluation:  AROM of shoulder flexion: 126 degrees left, 141 degrees right, shoulder extension: 56 degrees left, 57 degrees right, scaption: 138 degrees left, 141 degrees right, abduction: 100 degrees left, 139 degrees right, ER: crown of head left, C2 level right, IR: L1 level left, iliac crest right  Status at last progress note (8/15/17): met for PROM and progressing for AROM (AROM left shoulder flexion: 159 degrees (improved 33 degrees), Shoulder extension: 58 degrees (improved 2 degrees), Abduction: 144 degrees (improved 44 degrees), Scaption: 155 degrees with pain (improved 17 degrees), ER: C2-level (improved 6 inches), IR: T11-level (improved 2 levels); PROM left shoulder flexion: 162 degrees (improved 10 degrees), Abduction: 166 degrees (decreased 3 degrees), Scaption: 180 degrees (no change), ER: 76 degrees (decreased 14 degrees), IR: 72 degrees (improved 15 degrees))  Current status: not reassessed       2) Goal: Increase strength of left shoulder to 5/5 all muscle groups without increased pain in order to return to normal function. Status at initial evaluation: strength of UEs grossly 4/5 to 5/5 bilateral UEs with pain during resisted shoulder flexion bilaterally, abduction bilaterally, and IR on left only  Status at last progress note (8/15/17):   met for all except shoulder flexion and ER (Strength left shoulder flexion: 4/5 (no change), Shoulder extension: 5/5 (no change), Abduction: 5/5 (improved 1/3 grade), ER: 4+/5 (improved 1/3 grade), IR: 5/5 (no change), Elbow flexors: 5/5 (no change), Elbow extensors: 5/5 (no change))  Current status: not reassessed       3) Goal: Decrease pain in left shoulder to 2/10 at worst during ADLs in order to allow pt to sleep at least 8 hours straight and normalize function.   Status at initial evaluation: pain 7/10 at worst  Status at last progress note (8/15/17): met (pain 0/10 at worst)      PLAN  []  Upgrade activities as tolerated [x]  Continue plan of care  []  Update interventions per flow sheet       []  Discharge due to:_  []  Other:_      Edvin Hurtado, ISI 8/22/2017  11:28 AM    Future Appointments  Date Time Provider Jan Gaines   8/25/2017 11:00 AM Gabriella Mason [facility]   8/29/2017 10:30 AM ISI Mason [facility]   9/1/2017 10:30 AM ISI Dejesus [facility]

## 2017-08-25 ENCOUNTER — HOSPITAL ENCOUNTER (OUTPATIENT)
Dept: PHYSICAL THERAPY | Age: 72
Discharge: HOME | End: 2017-08-25
Payer: MEDICARE

## 2017-08-25 PROCEDURE — 97530 THERAPEUTIC ACTIVITIES: CPT

## 2017-08-25 PROCEDURE — 97110 THERAPEUTIC EXERCISES: CPT

## 2017-08-25 NOTE — PROGRESS NOTES
PT DAILY TREATMENT NOTE - [facility]     Patient Name: Hina Hall  Date:2017  : 1945  [x]  Patient  Verified  Payor: [place] MEDICARE / Plan: [place] MEDICARE PART A & B / Product Type: Medicare /    In time:11:05  Out time:11:54  Total Treatment Time (min): 49  Total Timed Codes (min): 49  1:1 Treatment Time ( [address] only): 40   Visit #: 18 of 23    Treatment Area: Pain in shoulder region, left [M25.512]    SUBJECTIVE  Pain Level (0-10 scale): 0/10  Any medication changes, allergies to medications, adverse drug reactions, diagnosis change, or new procedure performed?: [x] No    [] Yes (see summary sheet for update)  Subjective functional status/changes:   [x] No changes reported  \" I don't have any pain but the only thing that is hard for me is to reach a shelf overhead. \"    OBJECTIVE    Modality rationale:    Min Type Additional Details    [] Estim:  []Unatt       []IFC  []Premod                        []Other:  []w/ice   []w/heat  Position:  Location:    [] Estim: []Att    []TENS instruct  []NMES                    []Other:  []w/US   []w/ice   []w/heat  Position:  Location:    []  Traction: [] Cervical       []Lumbar                       [] Prone          []Supine                       []Intermittent   []Continuous Lbs:  [] before manual  [] after manual    []  Ultrasound: []Continuous   [] Pulsed                           []1MHz   []3MHz W/cm2:  Location:    []  Iontophoresis with dexamethasone         Location: [] Take home patch   [] In clinic    []  Ice     []  heat  []  Ice massage  []  Laser   []  Anodyne Position:  Location:    []  Laser with stim  []  Other:  Position:  Location:    []  Vasopneumatic Device Pressure:       [] lo [] med [] hi   Temperature: [] lo [] med [] hi   [] Skin assessment post-treatment:  []intact []redness- no adverse reaction    []redness - adverse reaction:      min []Eval                  []Re-Eval       30 min Therapeutic Exercise:  [x] See flow sheet :  Focus on overhead strength left shoulder to improve functional overhead activities with ADL   Rationale: increase ROM and increase strength to improve the patients ability to use left shoulder without pain in ADLs and above shoulder UE activities. 19 min Therapeutic Activity:  [x]  See flow sheet :patient was given HEP with focus on overhead reaching/strength         min Neuromuscular Re-education:  []  See flow sheet :        min Manual Therapy:          min Gait Training:  ___ feet with ___ device on level surfaces with ___ level of assist   Rationale: With   [] TE   [] TA   [] neuro   [] other: Patient Education: [x] Review HEP    [] Progressed/Changed HEP based on:   [] positioning   [] body mechanics   [] transfers   [] heat/ice application    [] other:      Other Objective/Functional Measures:   Shoulder Flex left 140,  in seated position  No pain with AROM and advanced activities  Marked fatigue with overhead strengthening     Pain Level (0-10 scale) post treatment: 0/10    ASSESSMENT/Changes in Function:    Pt's shoulder strength and function improving and slowly normalizing. Patient will continue to benefit from skilled PT services to modify and progress therapeutic interventions, address functional mobility deficits, address ROM deficits, address strength deficits, analyze and address soft tissue restrictions and analyze and cue movement patterns to attain remaining goals. [x]  See Plan of Care  []  See progress note/recertification  []  See Discharge Summary         Progress towards goals / Updated goals:   1) Goal: Pt will be independent and compliant with HEP to achieve other goals.   Status at initial evaluation: pt is not independent with exercises  Status at last progress note (8/15/17): progressing (pt is over 90% independent with current exercises.)  Current status: not reassessed       2) Goal: Pt's AROM/PROM of left shoulder will increase by 5-10 degrees per week in order to prepare for strengthening and improve ADLs. Status at initial evaluation: AROM of shoulder flexion: 126 degrees left, 141 degrees right, shoulder extension: 56 degrees left, 57 degrees right, scaption: 138 degrees left, 141 degrees right, abduction: 100 degrees left, 139 degrees right, ER: crown of head left, C2 level right, IR: L1 level left, iliac crest right; PROM of left shoulder flexion: 152 degrees left, scaption: 180 degrees, abduction: 169 degrees, ER: 90 degrees, IR: 57 degrees  Status at last progress note (8/15/17): met (AROM left shoulder flexion: 159 degrees (improved 33 degrees), Shoulder extension: 58 degrees (improved 2 degrees), Abduction: 144 degrees (improved 44 degrees), Scaption: 155 degrees with pain (improved 17 degrees), ER: C2-level (improved 6 inches), IR: T11-level (improved 2 levels); PROM left shoulder flexion: 162 degrees (improved 10 degrees), Abduction: 166 degrees (decreased 3 degrees), Scaption: 180 degrees (no change), ER: 76 degrees (decreased 14 degrees), IR: 72 degrees (improved 15 degrees))  Current status: not reassessed       3) Goal: Increase strength of left shoulder by 1/3 grade in order to allow pt to reach overhead cabinet and lift up to 3 lbs. without increased pain. Status at initial evaluation: strength of UEs grossly 4/5 to 5/5 bilateral UEs with pain during resisted shoulder flexion bilaterally, abduction bilaterally, and IR on left only  Status at last progress note (8/15/17):  met for all except shoulder flexion (Strength left shoulder flexion: 4/5 (no change), Shoulder extension: 5/5 (no change), Abduction: 5/5 (improved 1/3 grade), ER: 4+/5 (improved 1/3 grade), IR: 5/5 (no change), Elbow flexors: 5/5 (no change), Elbow extensors: 5/5 (no change))  Current status: not reassessed       4) Decrease pain in left shoulder to 4/10 at worst during ADLs in order to allow pt to sleep at least 5 hours straight.   Status at initial evaluation: pain 7/10 at worst  Status at last progress note (8/15/17): met (pain 0/10 at worst)  Current status: no pain with ADL      Long Term Goals (To be completed in 8 weeks)   1) Goal: Increase AROM/PROM of left shoulder to >90% of normal in all directions without increased pain in order to return to normal function. Status at initial evaluation:  AROM of shoulder flexion: 126 degrees left, 141 degrees right, shoulder extension: 56 degrees left, 57 degrees right, scaption: 138 degrees left, 141 degrees right, abduction: 100 degrees left, 139 degrees right, ER: crown of head left, C2 level right, IR: L1 level left, iliac crest right  Status at last progress note (8/15/17): met for PROM and progressing for AROM (AROM left shoulder flexion: 159 degrees (improved 33 degrees), Shoulder extension: 58 degrees (improved 2 degrees), Abduction: 144 degrees (improved 44 degrees), Scaption: 155 degrees with pain (improved 17 degrees), ER: C2-level (improved 6 inches), IR: T11-level (improved 2 levels); PROM left shoulder flexion: 162 degrees (improved 10 degrees), Abduction: 166 degrees (decreased 3 degrees), Scaption: 180 degrees (no change), ER: 76 degrees (decreased 14 degrees), IR: 72 degrees (improved 15 degrees))  Current status: Left shoulder Flex 140,  active in seated position       2) Goal: Increase strength of left shoulder to 5/5 all muscle groups without increased pain in order to return to normal function.   Status at initial evaluation: strength of UEs grossly 4/5 to 5/5 bilateral UEs with pain during resisted shoulder flexion bilaterally, abduction bilaterally, and IR on left only  Status at last progress note (8/15/17):   met for all except shoulder flexion and ER (Strength left shoulder flexion: 4/5 (no change), Shoulder extension: 5/5 (no change), Abduction: 5/5 (improved 1/3 grade), ER: 4+/5 (improved 1/3 grade), IR: 5/5 (no change), Elbow flexors: 5/5 (no change), Elbow extensors: 5/5 (no change))  Current status: difficulty/fatigue with resisted left shoulder ER at 90/90 and overhead reaching, progressing       3) Goal: Decrease pain in left shoulder to 2/10 at worst during ADLs in order to allow pt to sleep at least 8 hours straight and normalize function.   Status at initial evaluation: pain 7/10 at worst  Status at last progress note (8/15/17): met (pain 0/10 at worst)      PLAN  []  Upgrade activities as tolerated     [x]  Continue plan of care  []  Update interventions per flow sheet       []  Discharge due to:_  []  Other:_      Jim Kennedy PT 8/25/2017  11:28 AM    Future Appointments  Date Time Provider Jan Gaines   8/29/2017 10:30 AM Marion mayers, PT [facility]   9/1/2017 10:30 AM Melba Morales V PT [facility]   9/5/2017 11:00 AM Melba Morales V PT [facility]   9/7/2017 1:00 PM Melba Morales V PT [facility]   9/12/2017 9:30 AM Roxana mayers, PT [facility]

## 2017-08-29 ENCOUNTER — HOSPITAL ENCOUNTER (OUTPATIENT)
Dept: PHYSICAL THERAPY | Age: 72
Discharge: HOME | End: 2017-08-29
Payer: MEDICARE

## 2017-08-29 PROCEDURE — 97110 THERAPEUTIC EXERCISES: CPT

## 2017-08-29 PROCEDURE — 97112 NEUROMUSCULAR REEDUCATION: CPT

## 2017-08-29 NOTE — PROGRESS NOTES
PT DAILY TREATMENT NOTE - [facility]     Patient Name: Domenica Ramos  Date:2017  : 1945  [x]  Patient  Verified  Payor: Trang Lebron / Plan: [place] / Product Type: Medicare /    In time:1030  Out time:1122  Total Treatment Time (min): 52  Total Timed Codes (min): 52  1:1 Treatment Time ( [address] only): 40   Visit #: 19 of 23    Treatment Area: Pain in shoulder region, left [M25.512]    SUBJECTIVE  Pain Level (0-10 scale): 0/10  Any medication changes, allergies to medications, adverse drug reactions, diagnosis change, or new procedure performed?: [x] No    [] Yes (see summary sheet for update)  Subjective functional status/changes:   [x] No changes reported  \" I have been feeling good. \"    OBJECTIVE    Modality rationale:    Min Type Additional Details    [] Estim:  []Unatt       []IFC  []Premod                        []Other:  []w/ice   []w/heat  Position:  Location:    [] Estim: []Att    []TENS instruct  []NMES                    []Other:  []w/US   []w/ice   []w/heat  Position:  Location:    []  Traction: [] Cervical       []Lumbar                       [] Prone          []Supine                       []Intermittent   []Continuous Lbs:  [] before manual  [] after manual    []  Ultrasound: []Continuous   [] Pulsed                           []1MHz   []3MHz W/cm2:  Location:    []  Iontophoresis with dexamethasone         Location: [] Take home patch   [] In clinic    []  Ice     []  heat  []  Ice massage  []  Laser   []  Anodyne Position:  Location:    []  Laser with stim  []  Other:  Position:  Location:    []  Vasopneumatic Device Pressure:       [] lo [] med [] hi   Temperature: [] lo [] med [] hi   [] Skin assessment post-treatment:  []intact []redness- no adverse reaction    []redness - adverse reaction:      min []Eval                  []Re-Eval       30 min Therapeutic Exercise:  [x] See flow sheet :  Focus on overhead strength left shoulder to improve functional overhead activities with ADL   Rationale: increase ROM and increase strength to improve the patients ability to use left shoulder without pain in ADLs and above shoulder UE activities. min Therapeutic Activity:  [x]  See flow sheet :        22 min Neuromuscular Re-education:  [x]  See flow sheet :shoulder stability activities, proprioceptive training        min Manual Therapy:          min Gait Training:  ___ feet with ___ device on level surfaces with ___ level of assist   Rationale: With   [] TE   [] TA   [] neuro   [] other: Patient Education: [x] Review HEP    [] Progressed/Changed HEP based on:   [] positioning   [] body mechanics   [] transfers   [] heat/ice application    [] other:      Other Objective/Functional Measures:   Challenged with current activities     Pain Level (0-10 scale) post treatment: <1/10    ASSESSMENT/Changes in Function:    Pt's shoulder strength and function improving and slowly normalizing. Patient will continue to benefit from skilled PT services to modify and progress therapeutic interventions, address functional mobility deficits, address ROM deficits, address strength deficits, analyze and address soft tissue restrictions and analyze and cue movement patterns to attain remaining goals. [x]  See Plan of Care  []  See progress note/recertification  []  See Discharge Summary         Progress towards goals / Updated goals:   1) Goal: Pt will be independent and compliant with HEP to achieve other goals. Status at initial evaluation: pt is not independent with exercises  Status at last progress note (8/15/17): progressing (pt is over 90% independent with current exercises.)  Current status: not reassessed       2) Goal: Pt's AROM/PROM of left shoulder will increase by 5-10 degrees per week in order to prepare for strengthening and improve ADLs.   Status at initial evaluation: AROM of shoulder flexion: 126 degrees left, 141 degrees right, shoulder extension: 56 degrees left, 57 degrees right, scaption: 138 degrees left, 141 degrees right, abduction: 100 degrees left, 139 degrees right, ER: crown of head left, C2 level right, IR: L1 level left, iliac crest right; PROM of left shoulder flexion: 152 degrees left, scaption: 180 degrees, abduction: 169 degrees, ER: 90 degrees, IR: 57 degrees  Status at last progress note (8/15/17): met (AROM left shoulder flexion: 159 degrees (improved 33 degrees), Shoulder extension: 58 degrees (improved 2 degrees), Abduction: 144 degrees (improved 44 degrees), Scaption: 155 degrees with pain (improved 17 degrees), ER: C2-level (improved 6 inches), IR: T11-level (improved 2 levels); PROM left shoulder flexion: 162 degrees (improved 10 degrees), Abduction: 166 degrees (decreased 3 degrees), Scaption: 180 degrees (no change), ER: 76 degrees (decreased 14 degrees), IR: 72 degrees (improved 15 degrees))  Current status: not reassessed       3) Goal: Increase strength of left shoulder by 1/3 grade in order to allow pt to reach overhead cabinet and lift up to 3 lbs. without increased pain. Status at initial evaluation: strength of UEs grossly 4/5 to 5/5 bilateral UEs with pain during resisted shoulder flexion bilaterally, abduction bilaterally, and IR on left only  Status at last progress note (8/15/17):  met for all except shoulder flexion (Strength left shoulder flexion: 4/5 (no change), Shoulder extension: 5/5 (no change), Abduction: 5/5 (improved 1/3 grade), ER: 4+/5 (improved 1/3 grade), IR: 5/5 (no change), Elbow flexors: 5/5 (no change), Elbow extensors: 5/5 (no change))  Current status: not reassessed       4) Decrease pain in left shoulder to 4/10 at worst during ADLs in order to allow pt to sleep at least 5 hours straight.   Status at initial evaluation: pain 7/10 at worst  Status at last progress note (8/15/17): met (pain 0/10 at worst)  Current status: no pain with ADL      Long Term Goals (To be completed in 8 weeks)   1) Goal: Increase AROM/PROM of left shoulder to >90% of normal in all directions without increased pain in order to return to normal function. Status at initial evaluation:  AROM of shoulder flexion: 126 degrees left, 141 degrees right, shoulder extension: 56 degrees left, 57 degrees right, scaption: 138 degrees left, 141 degrees right, abduction: 100 degrees left, 139 degrees right, ER: crown of head left, C2 level right, IR: L1 level left, iliac crest right  Status at last progress note (8/15/17): met for PROM and progressing for AROM (AROM left shoulder flexion: 159 degrees (improved 33 degrees), Shoulder extension: 58 degrees (improved 2 degrees), Abduction: 144 degrees (improved 44 degrees), Scaption: 155 degrees with pain (improved 17 degrees), ER: C2-level (improved 6 inches), IR: T11-level (improved 2 levels); PROM left shoulder flexion: 162 degrees (improved 10 degrees), Abduction: 166 degrees (decreased 3 degrees), Scaption: 180 degrees (no change), ER: 76 degrees (decreased 14 degrees), IR: 72 degrees (improved 15 degrees))  Current status: Left shoulder Flex 140,  active in seated position       2) Goal: Increase strength of left shoulder to 5/5 all muscle groups without increased pain in order to return to normal function.   Status at initial evaluation: strength of UEs grossly 4/5 to 5/5 bilateral UEs with pain during resisted shoulder flexion bilaterally, abduction bilaterally, and IR on left only  Status at last progress note (8/15/17):   met for all except shoulder flexion and ER (Strength left shoulder flexion: 4/5 (no change), Shoulder extension: 5/5 (no change), Abduction: 5/5 (improved 1/3 grade), ER: 4+/5 (improved 1/3 grade), IR: 5/5 (no change), Elbow flexors: 5/5 (no change), Elbow extensors: 5/5 (no change))  Current status: difficulty/fatigue with resisted left shoulder ER at 90/90 and overhead reaching, progressing       3) Goal: Decrease pain in left shoulder to 2/10 at worst during ADLs in order to allow pt to sleep at least 8 hours straight and normalize function.   Status at initial evaluation: pain 7/10 at worst  Status at last progress note (8/15/17): met (pain 0/10 at worst)      PLAN  []  Upgrade activities as tolerated     [x]  Continue plan of care  []  Update interventions per flow sheet       []  Discharge due to:_  []  Other:_      Hang Krause, PT 8/29/2017  11:28 AM    Future Appointments  Date Time Provider Jan Gaines   9/1/2017 10:30 AM Eliazar Bhardwaj, PT [facility]   9/5/2017 11:00 AM Adrienne Davison V, PT [facility]   9/7/2017 1:00 PM Adrienne Davison V PT [facility]   9/12/2017 9:30 AM Dominguez Adame, PT [facility]

## 2017-09-01 ENCOUNTER — HOSPITAL ENCOUNTER (OUTPATIENT)
Dept: PHYSICAL THERAPY | Age: 72
Discharge: HOME | End: 2017-09-01
Payer: MEDICARE

## 2017-09-01 PROCEDURE — 97110 THERAPEUTIC EXERCISES: CPT

## 2017-09-01 NOTE — PROGRESS NOTES
PT DAILY TREATMENT NOTE - [facility]     Patient Name: Geovani Gunn  Date:2017  : 1945  [x]  Patient  Verified  Payor: [place] MEDICARE / Plan: [place] MEDICARE PART A & B / Product Type: Medicare /    In time:10:34  Out time:11:20  Total Treatment Time (min): 46  Total Timed Codes (min): 46  1:1 Treatment Time ( [address] only): 45   Visit #: 20 of 23    Treatment Area: Pain in shoulder region, left [M25.512]    SUBJECTIVE  Pain Level (0-10 scale): 0/10  Any medication changes, allergies to medications, adverse drug reactions, diagnosis change, or new procedure performed?: [x] No    [] Yes (see summary sheet for update)  Subjective functional status/changes:   [] No changes reported  \"No pain since last treatment. \"    OBJECTIVE    Modality rationale:    Min Type Additional Details    [] Estim:  []Unatt       []IFC  []Premod                        []Other:  []w/ice   []w/heat  Position:  Location:    [] Estim: []Att    []TENS instruct  []NMES                    []Other:  []w/US   []w/ice   []w/heat  Position:  Location:    []  Traction: [] Cervical       []Lumbar                       [] Prone          []Supine                       []Intermittent   []Continuous Lbs:  [] before manual  [] after manual    []  Ultrasound: []Continuous   [] Pulsed                           []1MHz   []3MHz W/cm2:  Location:    []  Iontophoresis with dexamethasone         Location: [] Take home patch   [] In clinic    []  Ice     []  heat  []  Ice massage  []  Laser   []  Anodyne Position:  Location:    []  Laser with stim  []  Other:  Position:  Location:    []  Vasopneumatic Device Pressure:       [] lo [] med [] hi   Temperature: [] lo [] med [] hi   [] Skin assessment post-treatment:  []intact []redness- no adverse reaction    []redness - adverse reaction:      min []Eval                  []Re-Eval       46 total  38 1:1 min Therapeutic Exercise:  [] See flow sheet :   Rationale: increase ROM and increase strength to improve the patients ability to use left shoulder without pain in ADLs and above shoulder UE activities. min Therapeutic Activity:  []  See flow sheet :         min Neuromuscular Re-education:  []  See flow sheet :        min Manual Therapy:          min Gait Training:  ___ feet with ___ device on level surfaces with ___ level of assist   Rationale: With   [] TE   [] TA   [] neuro   [] other: Patient Education: [x] Review HEP    [] Progressed/Changed HEP based on:   [] positioning   [] body mechanics   [] transfers   [] heat/ice application    [] other:      Other Objective/Functional Measures:   No objective measures taken today. Pain Level (0-10 scale) post treatment: 0/10    ASSESSMENT/Changes in Function:    Pt's strength has been challenged by current level of exercises. Strength and function improving at home. Patient will continue to benefit from skilled PT services to modify and progress therapeutic interventions, address functional mobility deficits, address ROM deficits, address strength deficits, analyze and address soft tissue restrictions and analyze and cue movement patterns to attain remaining goals.      [x]  See Plan of Care  []  See progress note/recertification  []  See Discharge Summary      Progress towards goals / Updated goals:                        1) Goal: Pt will be independent and compliant with HEP to achieve other goals. Status at initial evaluation: pt is not independent with exercises  Status at last progress note (8/15/17): progressing (pt is over 90% independent with current exercises.)  Current status: progressing (pt is over 95% independent with exercises) 9/01/17                            2) Goal: Pt's AROM/PROM of left shoulder will increase by 5-10 degrees per week in order to prepare for strengthening and improve ADLs.   Status at initial evaluation: AROM of shoulder flexion: 126 degrees left, 141 degrees right, shoulder extension: 56 degrees left, 57 degrees right, scaption: 138 degrees left, 141 degrees right, abduction: 100 degrees left, 139 degrees right, ER: crown of head left, C2 level right, IR: L1 level left, iliac crest right; PROM of left shoulder flexion: 152 degrees left, scaption: 180 degrees, abduction: 169 degrees, ER: 90 degrees, IR: 57 degrees  Status at last progress note (8/15/17): met (AROM left shoulder flexion: 159 degrees (improved 33 degrees), Shoulder extension: 58 degrees (improved 2 degrees), Abduction: 144 degrees (improved 44 degrees), Scaption: 155 degrees with pain (improved 17 degrees), ER: C2-level (improved 6 inches), IR: T11-level (improved 2 levels); PROM left shoulder flexion: 162 degrees (improved 10 degrees), Abduction: 166 degrees (decreased 3 degrees), Scaption: 180 degrees (no change), ER: 76 degrees (decreased 14 degrees), IR: 72 degrees (improved 15 degrees))  Current status: not reassessed                            3) Goal: Increase strength of left shoulder by 1/3 grade in order to allow pt to reach overhead cabinet and lift up to 3 lbs. without increased pain. Status at initial evaluation: strength of UEs grossly 4/5 to 5/5 bilateral UEs with pain during resisted shoulder flexion bilaterally, abduction bilaterally, and IR on left only  Status at last progress note (8/15/17):  met for all except shoulder flexion (Strength left shoulder flexion: 4/5 (no change), Shoulder extension: 5/5 (no change), Abduction: 5/5 (improved 1/3 grade), ER: 4+/5 (improved 1/3 grade), IR: 5/5 (no change), Elbow flexors: 5/5 (no change), Elbow extensors: 5/5 (no change))  Current status: not reassessed                            4) Decrease pain in left shoulder to 4/10 at worst during ADLs in order to allow pt to sleep at least 5 hours straight.   Status at initial evaluation: pain 7/10 at worst  Status at last progress note (8/15/17): met (pain 0/10 at worst)  Current status: met (no pain with ADLs)      Long Term Goals (To be completed in 8 weeks) 1) Goal: Increase AROM/PROM of left shoulder to >90% of normal in all directions without increased pain in order to return to normal function. Status at initial evaluation:  AROM of shoulder flexion: 126 degrees left, 141 degrees right, shoulder extension: 56 degrees left, 57 degrees right, scaption: 138 degrees left, 141 degrees right, abduction: 100 degrees left, 139 degrees right, ER: crown of head left, C2 level right, IR: L1 level left, iliac crest right  Status at last progress note (8/15/17): met for PROM and progressing for AROM (AROM left shoulder flexion: 159 degrees (improved 33 degrees), Shoulder extension: 58 degrees (improved 2 degrees), Abduction: 144 degrees (improved 44 degrees), Scaption: 155 degrees with pain (improved 17 degrees), ER: C2-level (improved 6 inches), IR: T11-level (improved 2 levels); PROM left shoulder flexion: 162 degrees (improved 10 degrees), Abduction: 166 degrees (decreased 3 degrees), Scaption: 180 degrees (no change), ER: 76 degrees (decreased 14 degrees), IR: 72 degrees (improved 15 degrees))  Current status: Left shoulder Flex 140,  active in seated position                            2) Goal: Increase strength of left shoulder to 5/5 all muscle groups without increased pain in order to return to normal function.   Status at initial evaluation: strength of UEs grossly 4/5 to 5/5 bilateral UEs with pain during resisted shoulder flexion bilaterally, abduction bilaterally, and IR on left only  Status at last progress note (8/15/17):   met for all except shoulder flexion and ER (Strength left shoulder flexion: 4/5 (no change), Shoulder extension: 5/5 (no change), Abduction: 5/5 (improved 1/3 grade), ER: 4+/5 (improved 1/3 grade), IR: 5/5 (no change), Elbow flexors: 5/5 (no change), Elbow extensors: 5/5 (no change))  Current status: difficulty/fatigue with resisted left shoulder ER at 90/90 and overhead reaching, progressing                            3) Goal: Decrease pain in left shoulder to 2/10 at worst during ADLs in order to allow pt to sleep at least 8 hours straight and normalize function.   Status at initial evaluation: pain 7/10 at worst  Status at last progress note (8/15/17): met (pain 0/10 at worst)  Current status: met (no pain with ADLs) 9/01/17    PLAN  []  Upgrade activities as tolerated     [x]  Continue plan of care  []  Update interventions per flow sheet       []  Discharge due to:_  []  Other:_      Jeanie Omalley, PT 9/1/2017  11:52 AM    Future Appointments  Date Time Provider Jan Gaines   9/5/2017 11:00 AM ISI Brown [facility]   9/7/2017 1:00 PM ISI Brown [facility]   9/12/2017 9:30 AM Rocio Weeks, PT [facility]

## 2017-09-05 ENCOUNTER — HOSPITAL ENCOUNTER (OUTPATIENT)
Dept: PHYSICAL THERAPY | Age: 72
Discharge: HOME | End: 2017-09-05
Payer: MEDICARE

## 2017-09-05 PROCEDURE — 97110 THERAPEUTIC EXERCISES: CPT

## 2017-09-05 NOTE — PROGRESS NOTES
PT DAILY TREATMENT NOTE - [facility]     Patient Name: Terri Rahman  Date:2017  : 1945  [x]  Patient  Verified  Payor: [place] MEDICARE / Plan: [place] MEDICARE PART A & B / Product Type: Medicare /    In time:11:04  Out time:11:44  Total Treatment Time (min): 40  Total Timed Codes (min): 40  1:1 Treatment Time ([address] only): 25   Visit #: 21 of 23    Treatment Area: Pain in shoulder region, left [M25.512]    SUBJECTIVE  Pain Level (0-10 scale): 0/10  Any medication changes, allergies to medications, adverse drug reactions, diagnosis change, or new procedure performed?: [x] No    [] Yes (see summary sheet for update)  Subjective functional status/changes:   [x] No changes reported    OBJECTIVE    Modality rationale:    Min Type Additional Details    [] Estim:  []Unatt       []IFC  []Premod                        []Other:  []w/ice   []w/heat  Position:  Location:    [] Estim: []Att    []TENS instruct  []NMES                    []Other:  []w/US   []w/ice   []w/heat  Position:  Location:    []  Traction: [] Cervical       []Lumbar                       [] Prone          []Supine                       []Intermittent   []Continuous Lbs:  [] before manual  [] after manual    []  Ultrasound: []Continuous   [] Pulsed                           []1MHz   []3MHz W/cm2:  Location:    []  Iontophoresis with dexamethasone         Location: [] Take home patch   [] In clinic    []  Ice     []  heat  []  Ice massage  []  Laser   []  Anodyne Position:  Location:    []  Laser with stim  []  Other:  Position:  Location:    []  Vasopneumatic Device Pressure:       [] lo [] med [] hi   Temperature: [] lo [] med [] hi   [] Skin assessment post-treatment:  []intact []redness- no adverse reaction    []redness - adverse reaction:      min []Eval                  []Re-Eval       40 total  25 1:1 min Therapeutic Exercise:  [] See flow sheet :   Rationale: increase ROM and increase strength to improve the patients ability to use left shoulder without pain in ADLs and above shoulder UE activities. min Therapeutic Activity:  []  See flow sheet :         min Neuromuscular Re-education:  []  See flow sheet :        min Manual Therapy:          min Gait Training:  ___ feet with ___ device on level surfaces with ___ level of assist   Rationale: With   [] TE   [] TA   [] neuro   [] other: Patient Education: [x] Review HEP    [] Progressed/Changed HEP based on:   [] positioning   [] body mechanics   [] transfers   [] heat/ice application    [] other:      Other Objective/Functional Measures:   No objective measures taken today. Pain Level (0-10 scale) post treatment: 0/10    ASSESSMENT/Changes in Function:    Pain remains abolished during ADLs. Strength and function slowly improving per pt's reports. Patient will continue to benefit from skilled PT services to modify and progress therapeutic interventions, address functional mobility deficits, address ROM deficits, address strength deficits, analyze and address soft tissue restrictions and analyze and cue movement patterns to attain remaining goals.      [x]  See Plan of Care  []  See progress note/recertification  []  See Discharge Summary      Progress towards goals / Updated goals:                        1) Goal: Pt will be independent and compliant with HEP to achieve other goals. Status at initial evaluation: pt is not independent with exercises  Status at last progress note (8/15/17): progressing (pt is over 90% independent with current exercises.)  Current status: progressing (pt is over 95% independent with exercises) 9/01/17                            2) Goal: Pt's AROM/PROM of left shoulder will increase by 5-10 degrees per week in order to prepare for strengthening and improve ADLs.   Status at initial evaluation: AROM of shoulder flexion: 126 degrees left, 141 degrees right, shoulder extension: 56 degrees left, 57 degrees right, scaption: 138 degrees left, 141 degrees right, abduction: 100 degrees left, 139 degrees right, ER: crown of head left, C2 level right, IR: L1 level left, iliac crest right; PROM of left shoulder flexion: 152 degrees left, scaption: 180 degrees, abduction: 169 degrees, ER: 90 degrees, IR: 57 degrees  Status at last progress note (8/15/17): met (AROM left shoulder flexion: 159 degrees (improved 33 degrees), Shoulder extension: 58 degrees (improved 2 degrees), Abduction: 144 degrees (improved 44 degrees), Scaption: 155 degrees with pain (improved 17 degrees), ER: C2-level (improved 6 inches), IR: T11-level (improved 2 levels); PROM left shoulder flexion: 162 degrees (improved 10 degrees), Abduction: 166 degrees (decreased 3 degrees), Scaption: 180 degrees (no change), ER: 76 degrees (decreased 14 degrees), IR: 72 degrees (improved 15 degrees))  Current status: not reassessed                            3) Goal: Increase strength of left shoulder by 1/3 grade in order to allow pt to reach overhead cabinet and lift up to 3 lbs. without increased pain. Status at initial evaluation: strength of UEs grossly 4/5 to 5/5 bilateral UEs with pain during resisted shoulder flexion bilaterally, abduction bilaterally, and IR on left only  Status at last progress note (8/15/17):  met for all except shoulder flexion (Strength left shoulder flexion: 4/5 (no change), Shoulder extension: 5/5 (no change), Abduction: 5/5 (improved 1/3 grade), ER: 4+/5 (improved 1/3 grade), IR: 5/5 (no change), Elbow flexors: 5/5 (no change), Elbow extensors: 5/5 (no change))  Current status: not reassessed                            4) Decrease pain in left shoulder to 4/10 at worst during ADLs in order to allow pt to sleep at least 5 hours straight.   Status at initial evaluation: pain 7/10 at worst  Status at last progress note (8/15/17): met (pain 0/10 at worst)  Current status: met (no pain with ADLs)      Long Term Goals (To be completed in 8 weeks)                        1) Goal: Increase AROM/PROM of left shoulder to >90% of normal in all directions without increased pain in order to return to normal function. Status at initial evaluation:  AROM of shoulder flexion: 126 degrees left, 141 degrees right, shoulder extension: 56 degrees left, 57 degrees right, scaption: 138 degrees left, 141 degrees right, abduction: 100 degrees left, 139 degrees right, ER: crown of head left, C2 level right, IR: L1 level left, iliac crest right  Status at last progress note (8/15/17): met for PROM and progressing for AROM (AROM left shoulder flexion: 159 degrees (improved 33 degrees), Shoulder extension: 58 degrees (improved 2 degrees), Abduction: 144 degrees (improved 44 degrees), Scaption: 155 degrees with pain (improved 17 degrees), ER: C2-level (improved 6 inches), IR: T11-level (improved 2 levels); PROM left shoulder flexion: 162 degrees (improved 10 degrees), Abduction: 166 degrees (decreased 3 degrees), Scaption: 180 degrees (no change), ER: 76 degrees (decreased 14 degrees), IR: 72 degrees (improved 15 degrees))  Current status: Left shoulder Flex 140,  active in seated position                            2) Goal: Increase strength of left shoulder to 5/5 all muscle groups without increased pain in order to return to normal function.   Status at initial evaluation: strength of UEs grossly 4/5 to 5/5 bilateral UEs with pain during resisted shoulder flexion bilaterally, abduction bilaterally, and IR on left only  Status at last progress note (8/15/17):   met for all except shoulder flexion and ER (Strength left shoulder flexion: 4/5 (no change), Shoulder extension: 5/5 (no change), Abduction: 5/5 (improved 1/3 grade), ER: 4+/5 (improved 1/3 grade), IR: 5/5 (no change), Elbow flexors: 5/5 (no change), Elbow extensors: 5/5 (no change))  Current status: difficulty/fatigue with resisted left shoulder ER at 90/90 and overhead reaching, progressing                            3) Goal: Decrease pain in left shoulder to 2/10 at worst during ADLs in order to allow pt to sleep at least 8 hours straight and normalize function.   Status at initial evaluation: pain 7/10 at worst  Status at last progress note (8/15/17): met (pain 0/10 at worst)  Current status: met (no pain with ADLs) 9/01/17     PLAN  []  Upgrade activities as tolerated     []  Continue plan of care  []  Update interventions per flow sheet       []  Discharge due to:_  []  Other:_      Jenni Padilla, ISI 9/5/2017  1:37 PM    Future Appointments  Date Time Provider Jan Gaines   9/7/2017 1:00 PM Tammy Domingo V, PT [facility]   9/12/2017 9:30 AM Bradley Bull, PT [facility]

## 2017-09-07 ENCOUNTER — HOSPITAL ENCOUNTER (OUTPATIENT)
Dept: PHYSICAL THERAPY | Age: 72
Discharge: HOME | End: 2017-09-07
Payer: MEDICARE

## 2017-09-07 PROCEDURE — 97110 THERAPEUTIC EXERCISES: CPT

## 2017-09-07 NOTE — PROGRESS NOTES
PT DAILY TREATMENT NOTE - [facility]     Patient Name: Yvonne Kuhn  Date:2017  : 1945  [x]  Patient  Verified  Payor: [place] MEDICARE / Plan: VA MEDICARE PART A & B / Product Type: Medicare /    In time:10:05  Out time:10:48  Total Treatment Time (min): 43  Total Timed Codes (min): 43  1:1 Treatment Time ( [address] only): 25   Visit #: 22 of 23    Treatment Area: Pain in shoulder region, left [M25.512]    SUBJECTIVE  Pain Level (0-10 scale): 0/10  Any medication changes, allergies to medications, adverse drug reactions, diagnosis change, or new procedure performed?: [x] No    [] Yes (see summary sheet for update)  Subjective functional status/changes:   [x] No changes reported    OBJECTIVE    Modality rationale:    Min Type Additional Details    [] Estim:  []Unatt       []IFC  []Premod                        []Other:  []w/ice   []w/heat  Position:  Location:    [] Estim: []Att    []TENS instruct  []NMES                    []Other:  []w/US   []w/ice   []w/heat  Position:  Location:    []  Traction: [] Cervical       []Lumbar                       [] Prone          []Supine                       []Intermittent   []Continuous Lbs:  [] before manual  [] after manual    []  Ultrasound: []Continuous   [] Pulsed                           []1MHz   []3MHz W/cm2:  Location:    []  Iontophoresis with dexamethasone         Location: [] Take home patch   [] In clinic    []  Ice     []  heat  []  Ice massage  []  Laser   []  Anodyne Position:  Location:    []  Laser with stim  []  Other:  Position:  Location:    []  Vasopneumatic Device Pressure:       [] lo [] med [] hi   Temperature: [] lo [] med [] hi   [] Skin assessment post-treatment:  []intact []redness- no adverse reaction    []redness - adverse reaction:      min []Eval                  []Re-Eval       43 total  25 1:1 min Therapeutic Exercise:  [x] See flow sheet :   Rationale: increase ROM and increase strength to improve the patients ability to use left shoulder without pain in ADLs and above shoulder UE activities. min Therapeutic Activity:  []  See flow sheet :         min Neuromuscular Re-education:  []  See flow sheet :        min Manual Therapy:          min Gait Training:  ___ feet with ___ device on level surfaces with ___ level of assist   Rationale: With   [] TE   [] TA   [] neuro   [] other: Patient Education: [x] Review HEP    [] Progressed/Changed HEP based on:   [] positioning   [] body mechanics   [] transfers   [] heat/ice application    [] other:      Other Objective/Functional Measures:   AROM left shoulder flexion: 170 degrees  Abduction: 172 degrees  Scaption: 163 degrees  ER: C7  IR: T7 level  Strength shoulder flexors: 4/5  Abductors: 5/5  Shoulder extensors: 5/5  ER: 4+/5  IR: 5/5  Elbow flexors: 5/5  Elbow extensors: 5/5    Pain Level (0-10 scale) post treatment: 0/10    ASSESSMENT/Changes in Function:    Pt's shoulder strength unchanged since last progress note, but AROm has significantly improved and normalized to WNL. Pain remaining abolished. Patient will continue to benefit from skilled PT services to modify and progress therapeutic interventions, address functional mobility deficits, address ROM deficits, address strength deficits, analyze and address soft tissue restrictions and analyze and cue movement patterns to attain remaining goals. []  See Plan of Care  []  See progress note/recertification  []  See Discharge Summary         Progress towards goals / Updated goals:   1) Goal: Pt will be independent and compliant with HEP to achieve other goals.   Status at initial evaluation: pt is not independent with exercises  Status at last progress note (8/15/17): progressing (pt is over 90% independent with current exercises.)  Current status: progressing (pt is over 95% independent with exercises) 9/01/17                            2) Goal: Pt's AROM/PROM of left shoulder will increase by 5-10 degrees per week in order to prepare for strengthening and improve ADLs. Status at initial evaluation: AROM of shoulder flexion: 126 degrees left, 141 degrees right, shoulder extension: 56 degrees left, 57 degrees right, scaption: 138 degrees left, 141 degrees right, abduction: 100 degrees left, 139 degrees right, ER: crown of head left, C2 level right, IR: L1 level left, iliac crest right; PROM of left shoulder flexion: 152 degrees left, scaption: 180 degrees, abduction: 169 degrees, ER: 90 degrees, IR: 57 degrees  Status at last progress note (8/15/17): met (AROM left shoulder flexion: 159 degrees (improved 33 degrees), Shoulder extension: 58 degrees (improved 2 degrees), Abduction: 144 degrees (improved 44 degrees), Scaption: 155 degrees with pain (improved 17 degrees), ER: C2-level (improved 6 inches), IR: T11-level (improved 2 levels); PROM left shoulder flexion: 162 degrees (improved 10 degrees), Abduction: 166 degrees (decreased 3 degrees), Scaption: 180 degrees (no change), ER: 76 degrees (decreased 14 degrees), IR: 72 degrees (improved 15 degrees))  Current status: met (AROM left shoulder flexion: 170 degrees (improved 44 degrees), Abduction: 172 degrees (impved 72 degrees), Scaption: 163 degrees (improved 25 degrees), ER: C7 (improved from crown of head), IR: T7 level (improved 6 levels)) 9/07/17                            3) Goal: Increase strength of left shoulder by 1/3 grade in order to allow pt to reach overhead cabinet and lift up to 3 lbs. without increased pain.   Status at initial evaluation: strength of UEs grossly 4/5 to 5/5 bilateral UEs with pain during resisted shoulder flexion bilaterally, abduction bilaterally, and IR on left only  Status at last progress note (8/15/17):  met for all except shoulder flexion (Strength left shoulder flexion: 4/5 (no change), Shoulder extension: 5/5 (no change), Abduction: 5/5 (improved 1/3 grade), ER: 4+/5 (improved 1/3 grade), IR: 5/5 (no change), Elbow flexors: 5/5 (no change), Elbow extensors: 5/5 (no change))  Current status: not reassessed                            4) Decrease pain in left shoulder to 4/10 at worst during ADLs in order to allow pt to sleep at least 5 hours straight. Status at initial evaluation: pain 7/10 at worst  Status at last progress note (8/15/17): met (pain 0/10 at worst)  Current status: met (no pain with ADLs)      Long Term Goals (To be completed in 8 weeks)                        8) Goal: Increase AROM/PROM of left shoulder to >90% of normal in all directions without increased pain in order to return to normal function. Status at initial evaluation:  AROM of shoulder flexion: 126 degrees left, 141 degrees right, shoulder extension: 56 degrees left, 57 degrees right, scaption: 138 degrees left, 141 degrees right, abduction: 100 degrees left, 139 degrees right, ER: crown of head left, C2 level right, IR: L1 level left, iliac crest right  Status at last progress note (8/15/17): met for PROM and progressing for AROM (AROM left shoulder flexion: 159 degrees (improved 33 degrees), Shoulder extension: 58 degrees (improved 2 degrees), Abduction: 144 degrees (improved 44 degrees), Scaption: 155 degrees with pain (improved 17 degrees), ER: C2-level (improved 6 inches), IR: T11-level (improved 2 levels); PROM left shoulder flexion: 162 degrees (improved 10 degrees), Abduction: 166 degrees (decreased 3 degrees), Scaption: 180 degrees (no change), ER: 76 degrees (decreased 14 degrees), IR: 72 degrees (improved 15 degrees))  Current status: met (AROM left shoulder flexion: 170 degrees (improved 44 degrees), Abduction: 172 degrees (impved 72 degrees), Scaption: 163 degrees (improved 25 degrees), ER: C7 (improved from crown of head), IR: T7 level (improved 6 levels)) 9/07/17                            2) Goal: Increase strength of left shoulder to 5/5 all muscle groups without increased pain in order to return to normal function.   Status at initial evaluation: strength of UEs grossly 4/5 to 5/5 bilateral UEs with pain during resisted shoulder flexion bilaterally, abduction bilaterally, and IR on left only  Status at last progress note (8/15/17):   met for all except shoulder flexion and ER (Strength left shoulder flexion: 4/5 (no change), Shoulder extension: 5/5 (no change), Abduction: 5/5 (improved 1/3 grade), ER: 4+/5 (improved 1/3 grade), IR: 5/5 (no change), Elbow flexors: 5/5 (no change), Elbow extensors: 5/5 (no change))  Current status: no change since last progress note (Strength left shoulder flexors: 4/5, Abductors: 5/5, Shoulder extensors: 5/5, ER: 4+/5, IR: 5/5, Elbow flexors: 5/5, Elbow extensors: 5/5) 9/07/17                            3) Goal: Decrease pain in left shoulder to 2/10 at worst during ADLs in order to allow pt to sleep at least 8 hours straight and normalize function.   Status at initial evaluation: pain 7/10 at worst  Status at last progress note (8/15/17): met (pain 0/10 at worst)  Current status: met (no pain with ADLs) 9/01/17    PLAN  []  Upgrade activities as tolerated     [x]  Continue plan of care  []  Update interventions per flow sheet       []  Discharge due to:_  [x]  Other: 1 more visit then likely D/C      Zena Fenton, PT 9/7/2017  10:39 AM    Future Appointments  Date Time Provider Jan Gaines   9/12/2017 9:30 AM Stanley Howard, PT [facility]

## 2017-09-12 ENCOUNTER — HOSPITAL ENCOUNTER (OUTPATIENT)
Dept: PHYSICAL THERAPY | Age: 72
Discharge: HOME | End: 2017-09-12
Payer: MEDICARE

## 2017-09-12 PROCEDURE — G8988 SELF CARE GOAL STATUS: HCPCS

## 2017-09-12 PROCEDURE — 97530 THERAPEUTIC ACTIVITIES: CPT

## 2017-09-12 PROCEDURE — 97110 THERAPEUTIC EXERCISES: CPT

## 2017-09-12 PROCEDURE — G8989 SELF CARE D/C STATUS: HCPCS

## 2017-09-12 NOTE — PROGRESS NOTES
[facility] in [facility]. Karla Cervantes, [address] [place] [address]  Phone: [phone]      Fax:  [phone]    Discharge Summary         Patient name: Jerome Sales Start of Care: 6/15/17   Referral source: Ajay Martin MD : 1945   Medical/Treatment Diagnosis: Pain in shoulder region, left [M25.512],  left rotator cuff tear Onset Date: late 2017   Prior Hospitalization: see medical history Provider#: [id number]   Medications: Verified on Patient Summary List     Comorbidities: DM, HTN, thyroid problems, CA   Prior Level of Function: no deficits      Visits from Start of Care:     Missed Visits: 2  Reporting Period : 8/15/17 to 17  Progress towards goals / Updated goals:Mrs. Concha Dejesus has been showing nice improvement in pain (0/10), shoulder mobility and overall function. She continues with residual strength deficit in left RC which she will be able to address through her HEP. She has met 6 of 7 goals and unmet goal is related to residual strength deficit. I recommend discharge to HEP .   1) Goal: Pt will be independent and compliant with HEP to achieve other goals. Status at initial evaluation: pt is not independent with exercises  Status at last progress note (8/15/17): progressing (pt is over 90% independent with current exercises.)  Current status: patient is independent with advanced HEP, goal met                            2) Goal: Pt's AROM/PROM of left shoulder will increase by 5-10 degrees per week in order to prepare for strengthening and improve ADLs.   Status at initial evaluation: AROM of shoulder flexion: 126 degrees left, 141 degrees right, shoulder extension: 56 degrees left, 57 degrees right, scaption: 138 degrees left, 141 degrees right, abduction: 100 degrees left, 139 degrees right, ER: crown of head left, C2 level right, IR: L1 level left, iliac crest right; PROM of left shoulder flexion: 152 degrees left, scaption: 180 degrees, abduction: 169 degrees, ER: 90 degrees, IR: 57 degrees  Status at last progress note (8/15/17): met (AROM left shoulder flexion: 159 degrees (improved 33 degrees), Shoulder extension: 58 degrees (improved 2 degrees), Abduction: 144 degrees (improved 44 degrees), Scaption: 155 degrees with pain (improved 17 degrees), ER: C2-level (improved 6 inches), IR: T11-level (improved 2 levels); PROM left shoulder flexion: 162 degrees (improved 10 degrees), Abduction: 166 degrees (decreased 3 degrees), Scaption: 180 degrees (no change), ER: 76 degrees (decreased 14 degrees), IR: 72 degrees (improved 15 degrees))  Current status: met (AROM left shoulder flexion: 170 degrees (improved 44 degrees), Abduction: 172 degrees (impved 72 degrees), Scaption: 163 degrees (improved 25 degrees), ER: C7 (improved from crown of head), IR: T7 level (improved 6 levels)) 9/07/17                            3) Goal: Increase strength of left shoulder by 1/3 grade in order to allow pt to reach overhead cabinet and lift up to 3 lbs. without increased pain. Status at initial evaluation: strength of UEs grossly 4/5 to 5/5 bilateral UEs with pain during resisted shoulder flexion bilaterally, abduction bilaterally, and IR on left only  Status at last progress note (8/15/17):  met for all except shoulder flexion (Strength left shoulder flexion: 4/5 (no change), Shoulder extension: 5/5 (no change), Abduction: 5/5 (improved 1/3 grade), ER: 4+/5 (improved 1/3 grade), IR: 5/5 (no change), Elbow flexors: 5/5 (no change), Elbow extensors: 5/5 (no change))  Status at D/C: strength is unchanged since last progress report. Gross left shoulder strength 4/5, ability to reach into 2nd shelf of overhead cabinet with affected UE and compensates with right UE to lift objects, minimal progress                            4) Decrease pain in left shoulder to 4/10 at worst during ADLs in order to allow pt to sleep at least 5 hours straight.   Status at initial evaluation: pain 7/10 at worst  Status at last progress note (8/15/17): met (pain 0/10 at worst)  Current status: met (no pain with ADLs)      Long Term Goals (To be completed in 8 weeks)                        6) Goal: Increase AROM/PROM of left shoulder to >90% of normal in all directions without increased pain in order to return to normal function. Status at initial evaluation:  AROM of shoulder flexion: 126 degrees left, 141 degrees right, shoulder extension: 56 degrees left, 57 degrees right, scaption: 138 degrees left, 141 degrees right, abduction: 100 degrees left, 139 degrees right, ER: crown of head left, C2 level right, IR: L1 level left, iliac crest right  Status at last progress note (8/15/17): met for PROM and progressing for AROM (AROM left shoulder flexion: 159 degrees (improved 33 degrees), Shoulder extension: 58 degrees (improved 2 degrees), Abduction: 144 degrees (improved 44 degrees), Scaption: 155 degrees with pain (improved 17 degrees), ER: C2-level (improved 6 inches), IR: T11-level (improved 2 levels); PROM left shoulder flexion: 162 degrees (improved 10 degrees), Abduction: 166 degrees (decreased 3 degrees), Scaption: 180 degrees (no change), ER: 76 degrees (decreased 14 degrees), IR: 72 degrees (improved 15 degrees))  Current status: met (AROM left shoulder flexion: 170 degrees (improved 44 degrees), Abduction: 172 degrees (impved 72 degrees), Scaption: 163 degrees (improved 25 degrees), ER: C7 (improved from crown of head), IR: T7 level (improved 6 levels)) 9/07/17                            2) Goal: Increase strength of left shoulder to 5/5 all muscle groups without increased pain in order to return to normal function.   Status at initial evaluation: strength of UEs grossly 4/5 to 5/5 bilateral UEs with pain during resisted shoulder flexion bilaterally, abduction bilaterally, and IR on left only  Status at last progress note (8/15/17):   met for all except shoulder flexion and ER (Strength left shoulder flexion: 4/5 (no change), Shoulder extension: 5/5 (no change), Abduction: 5/5 (improved 1/3 grade), ER: 4+/5 (improved 1/3 grade), IR: 5/5 (no change), Elbow flexors: 5/5 (no change), Elbow extensors: 5/5 (no change))  Current status: no change since last progress note (Strength left shoulder flexors: 4/5, Abductors: 5/5, Shoulder extensors: 5/5, ER: 4+/5, IR: 5/5, Elbow flexors: 5/5, Elbow extensors: 5/5) 9/07/17      .                        3) Goal: Decrease pain in left shoulder to 2/10 at worst during ADLs in order to allow pt to sleep at least 8 hours straight and normalize function. Status at initial evaluation: pain 7/10 at worst  Status at last progress note (8/15/17): met (pain 0/10 at worst)  Current status: met (no pain with ADLs) 9/01/17      G-Codes (GP)    Self Care   Goal  CJ= 20-39%   D/C  CJ= 20-39%    The severity rating is based on clinical judgment and the FOTO score. Assessment/ Summary of Care: Good progress.  6 of 7 goals met    RECOMMENDATIONS:  [x]Discontinue therapy: []Patient has reached or is progressing toward set goals      []Patient is non-compliant or has abdicated      []Due to lack of appreciable progress towards set goals    Missael Sanchez, PT 9/12/2017 10:05 AM

## 2017-09-12 NOTE — PROGRESS NOTES
PT DAILY TREATMENT NOTE - [facility]     Patient Name: Valentino South  Date:2017  : 1945  [x]  Patient  Verified  [place]or: [place] MED[place] / Plan: [place] MEDICARE PART A & B / Product Type: Medicare /    In time:936  Out time:1021  Total Treatment Time (min): 45  Total Timed Codes (min): 45  1:1 Treatment Time ( [address] only): 30   Visit #: 23 of 23    Treatment Area: Pain in shoulder region, left [M25.512]    SUBJECTIVE  Pain Level (0-10 scale): 0/10  Any medication changes, allergies to medications, adverse drug reactions, diagnosis change, or new procedure performed?: [x] No    [] Yes (see summary sheet for update)  Subjective functional status/changes:   [x] No changes reported    OBJECTIVE    Modality rationale:    Min Type Additional Details    [] Estim:  []Unatt       []IFC  []Premod                        []Other:  []w/ice   []w/heat  Position:  Location:    [] Estim: []Att    []TENS instruct  []NMES                    []Other:  []w/US   []w/ice   []w/heat  Position:  Location:    []  Traction: [] Cervical       []Lumbar                       [] Prone          []Supine                       []Intermittent   []Continuous Lbs:  [] before manual  [] after manual    []  Ultrasound: []Continuous   [] Pulsed                           []1MHz   []3MHz W/cm2:  Location:    []  Iontophoresis with dexamethasone         Location: [] Take home patch   [] In clinic    []  Ice     []  heat  []  Ice massage  []  Laser   []  Anodyne Position:  Location:    []  Laser with stim  []  Other:  Position:  Location:    []  Vasopneumatic Device Pressure:       [] lo [] med [] hi   Temperature: [] lo [] med [] hi   [] Skin assessment post-treatment:  []intact []redness- no adverse reaction    []redness - adverse reaction:      min []Eval                  []Re-Eval       30 min Therapeutic Exercise:  [x] See flow sheet :   Rationale: increase ROM and increase strength to improve the patients ability to use left shoulder without pain in ADLs and above shoulder UE activities. 15 min Therapeutic Activity:  [x]  See flow sheet :reevaluation, review of advanced HEP         min Neuromuscular Re-education:  []  See flow sheet :        min Manual Therapy:          min Gait Training:  ___ feet with ___ device on level surfaces with ___ level of assist   Rationale: With   [] TE   [] TA   [] neuro   [] other: Patient Education: [x] Review HEP    [] Progressed/Changed HEP based on:   [] positioning   [] body mechanics   [] transfers   [] heat/ice application    [] other:      Other Objective/Functional Measures:   AROM left shoulder flexion: 170 degrees  Abduction: 172 degrees  Scaption: 163 degrees  ER: C7  IR: T7 level  Strength shoulder flexors: 4/5  Abductors: 5/5  Shoulder extensors: 5/5  ER: 4+/5  IR: 5/5  Elbow flexors: 5/5  Elbow extensors: 5/5  FOTO 65/100    Pain Level (0-10 scale) post treatment: 0/10    ASSESSMENT/Changes in Function:    Pt's shoulder strength unchanged since last progress note, but AROm has significantly improved and normalized to WNL. Pain remaining abolished. [x]  See Discharge Summary         Progress towards goals / Updated goals:Mrs. Juan Hayes has been showing nice improvement in pain (0/10), shoulder mobility and overall function (FOTO 65/100). She continues with residual strength deficit in left RC which she will be able to address through her HEP. She has met 6 of 7 goals and unmet goal is related to residual strength deficit. I recommend discharge to HEP .   1) Goal: Pt will be independent and compliant with HEP to achieve other goals.   Status at initial evaluation: pt is not independent with exercises  Status at last progress note (8/15/17): progressing (pt is over 90% independent with current exercises.)  Current status: patient is independent with advanced HEP, goal met                            2) Goal: Pt's AROM/PROM of left shoulder will increase by 5-10 degrees per week in order to prepare for strengthening and improve ADLs. Status at initial evaluation: AROM of shoulder flexion: 126 degrees left, 141 degrees right, shoulder extension: 56 degrees left, 57 degrees right, scaption: 138 degrees left, 141 degrees right, abduction: 100 degrees left, 139 degrees right, ER: crown of head left, C2 level right, IR: L1 level left, iliac crest right; PROM of left shoulder flexion: 152 degrees left, scaption: 180 degrees, abduction: 169 degrees, ER: 90 degrees, IR: 57 degrees  Status at last progress note (8/15/17): met (AROM left shoulder flexion: 159 degrees (improved 33 degrees), Shoulder extension: 58 degrees (improved 2 degrees), Abduction: 144 degrees (improved 44 degrees), Scaption: 155 degrees with pain (improved 17 degrees), ER: C2-level (improved 6 inches), IR: T11-level (improved 2 levels); PROM left shoulder flexion: 162 degrees (improved 10 degrees), Abduction: 166 degrees (decreased 3 degrees), Scaption: 180 degrees (no change), ER: 76 degrees (decreased 14 degrees), IR: 72 degrees (improved 15 degrees))  Current status: met (AROM left shoulder flexion: 170 degrees (improved 44 degrees), Abduction: 172 degrees (impved 72 degrees), Scaption: 163 degrees (improved 25 degrees), ER: C7 (improved from crown of head), IR: T7 level (improved 6 levels)) 9/07/17                            3) Goal: Increase strength of left shoulder by 1/3 grade in order to allow pt to reach overhead cabinet and lift up to 3 lbs. without increased pain.   Status at initial evaluation: strength of UEs grossly 4/5 to 5/5 bilateral UEs with pain during resisted shoulder flexion bilaterally, abduction bilaterally, and IR on left only  Status at last progress note (8/15/17):  met for all except shoulder flexion (Strength left shoulder flexion: 4/5 (no change), Shoulder extension: 5/5 (no change), Abduction: 5/5 (improved 1/3 grade), ER: 4+/5 (improved 1/3 grade), IR: 5/5 (no change), Elbow flexors: 5/5 (no change), Elbow extensors: 5/5 (no change))  Status at D/C: strength is unchanged since last progress report. Gross left shoulder strength 4/5, ability to reach into 2nd shelf of overhead cabinet with affected UE and compensates with right UE to lift objects, minimal progress                            4) Decrease pain in left shoulder to 4/10 at worst during ADLs in order to allow pt to sleep at least 5 hours straight. Status at initial evaluation: pain 7/10 at worst  Status at last progress note (8/15/17): met (pain 0/10 at worst)  Current status: met (no pain with ADLs)      Long Term Goals (To be completed in 8 weeks)                        6) Goal: Increase AROM/PROM of left shoulder to >90% of normal in all directions without increased pain in order to return to normal function.   Status at initial evaluation:  AROM of shoulder flexion: 126 degrees left, 141 degrees right, shoulder extension: 56 degrees left, 57 degrees right, scaption: 138 degrees left, 141 degrees right, abduction: 100 degrees left, 139 degrees right, ER: crown of head left, C2 level right, IR: L1 level left, iliac crest right  Status at last progress note (8/15/17): met for PROM and progressing for AROM (AROM left shoulder flexion: 159 degrees (improved 33 degrees), Shoulder extension: 58 degrees (improved 2 degrees), Abduction: 144 degrees (improved 44 degrees), Scaption: 155 degrees with pain (improved 17 degrees), ER: C2-level (improved 6 inches), IR: T11-level (improved 2 levels); PROM left shoulder flexion: 162 degrees (improved 10 degrees), Abduction: 166 degrees (decreased 3 degrees), Scaption: 180 degrees (no change), ER: 76 degrees (decreased 14 degrees), IR: 72 degrees (improved 15 degrees))  Current status: met (AROM left shoulder flexion: 170 degrees (improved 44 degrees), Abduction: 172 degrees (impved 72 degrees), Scaption: 163 degrees (improved 25 degrees), ER: C7 (improved from crown of head), IR: T7 level (improved 6 levels)) 9/07/17                            2) Goal: Increase strength of left shoulder to 5/5 all muscle groups without increased pain in order to return to normal function. Status at initial evaluation: strength of UEs grossly 4/5 to 5/5 bilateral UEs with pain during resisted shoulder flexion bilaterally, abduction bilaterally, and IR on left only  Status at last progress note (8/15/17):   met for all except shoulder flexion and ER (Strength left shoulder flexion: 4/5 (no change), Shoulder extension: 5/5 (no change), Abduction: 5/5 (improved 1/3 grade), ER: 4+/5 (improved 1/3 grade), IR: 5/5 (no change), Elbow flexors: 5/5 (no change), Elbow extensors: 5/5 (no change))  Current status: no change since last progress note (Strength left shoulder flexors: 4/5, Abductors: 5/5, Shoulder extensors: 5/5, ER: 4+/5, IR: 5/5, Elbow flexors: 5/5, Elbow extensors: 5/5) 9/07/17                            3) Goal: Decrease pain in left shoulder to 2/10 at worst during ADLs in order to allow pt to sleep at least 8 hours straight and normalize function. Status at initial evaluation: pain 7/10 at worst  Status at last progress note (8/15/17): met (pain 0/10 at worst)  Current status: met (no pain with ADLs) 9/01/17    PLAN  []  Upgrade activities as tolerated     [x]  Continue plan of care  []  Update interventions per flow sheet       []  Discharge due to:_  [x]  Other: 1 more visit then likely D/C      Marion Yeager, PT 9/12/2017  10:39 AM    No future appointments.

## 2018-02-08 ENCOUNTER — HOSPITAL ENCOUNTER (OUTPATIENT)
Dept: GENERAL RADIOLOGY | Age: 73
Discharge: HOME | End: 2018-02-08
Payer: MEDICARE

## 2018-02-08 DIAGNOSIS — M25.571 RIGHT ANKLE PAIN: ICD-10-CM

## 2018-02-08 PROCEDURE — 73610 X-RAY EXAM OF ANKLE: CPT

## 2020-06-16 ENCOUNTER — HOSPITAL ENCOUNTER (OUTPATIENT)
Dept: LAB | Age: 75
Discharge: HOME | End: 2020-06-16
Payer: MEDICARE

## 2020-06-16 PROCEDURE — 88360 TUMOR IMMUNOHISTOCHEM/MANUAL: CPT

## 2020-06-16 PROCEDURE — 88305 TISSUE EXAM BY PATHOLOGIST: CPT

## 2020-07-17 ENCOUNTER — HOSPITAL ENCOUNTER (OUTPATIENT)
Dept: PREADMISSION TESTING | Age: 75
Discharge: HOME | End: 2020-07-17
Payer: MEDICARE

## 2020-07-17 LAB
ALBUMIN SERPL-MCNC: 3.9 G/DL (ref 3.4–5)
ALBUMIN/GLOB SERPL: 1 {RATIO} (ref 0.8–1.7)
ALP SERPL-CCNC: 74 U/L (ref 45–117)
ALT SERPL-CCNC: 36 U/L (ref 13–56)
ANION GAP SERPL CALC-SCNC: 4 MMOL/L (ref 3–18)
AST SERPL-CCNC: 19 U/L (ref 10–38)
BASOPHILS # BLD: 0 K/UL (ref 0–0.1)
BASOPHILS NFR BLD: 1 % (ref 0–2)
BILIRUB SERPL-MCNC: 0.5 MG/DL (ref 0.2–1)
BUN SERPL-MCNC: 17 MG/DL (ref 7–18)
BUN/CREAT SERPL: 20 (ref 12–20)
CALCIUM SERPL-MCNC: 10 MG/DL (ref 8.5–10.1)
CHLORIDE SERPL-SCNC: 108 MMOL/L (ref 100–111)
CO2 SERPL-SCNC: 30 MMOL/L (ref 21–32)
CREAT SERPL-MCNC: 0.83 MG/DL (ref 0.6–1.3)
DIFFERENTIAL METHOD BLD: ABNORMAL
EOSINOPHIL # BLD: 0.2 K/UL (ref 0–0.4)
EOSINOPHIL NFR BLD: 3 % (ref 0–5)
ERYTHROCYTE [DISTWIDTH] IN BLOOD BY AUTOMATED COUNT: 15.7 % (ref 11.6–14.5)
GLOBULIN SER CALC-MCNC: 3.8 G/DL (ref 2–4)
GLUCOSE SERPL-MCNC: 197 MG/DL (ref 74–99)
HCT VFR BLD AUTO: 40.1 % (ref 35–45)
HGB BLD-MCNC: 13.1 G/DL (ref 12–16)
LYMPHOCYTES # BLD: 2.5 K/UL (ref 0.9–3.6)
LYMPHOCYTES NFR BLD: 41 % (ref 21–52)
MCH RBC QN AUTO: 25 PG (ref 24–34)
MCHC RBC AUTO-ENTMCNC: 32.7 G/DL (ref 31–37)
MCV RBC AUTO: 76.5 FL (ref 74–97)
MONOCYTES # BLD: 0.8 K/UL (ref 0.05–1.2)
MONOCYTES NFR BLD: 13 % (ref 3–10)
NEUTS SEG # BLD: 2.6 K/UL (ref 1.8–8)
NEUTS SEG NFR BLD: 42 % (ref 40–73)
PLATELET # BLD AUTO: 174 K/UL (ref 135–420)
PMV BLD AUTO: 11.1 FL (ref 9.2–11.8)
POTASSIUM SERPL-SCNC: 4.6 MMOL/L (ref 3.5–5.5)
PROT SERPL-MCNC: 7.7 G/DL (ref 6.4–8.2)
RBC # BLD AUTO: 5.24 M/UL (ref 4.2–5.3)
SODIUM SERPL-SCNC: 142 MMOL/L (ref 136–145)
WBC # BLD AUTO: 6 K/UL (ref 4.6–13.2)

## 2020-07-17 PROCEDURE — 87635 SARS-COV-2 COVID-19 AMP PRB: CPT

## 2020-07-17 PROCEDURE — 85025 COMPLETE CBC W/AUTO DIFF WBC: CPT

## 2020-07-17 PROCEDURE — 36415 COLL VENOUS BLD VENIPUNCTURE: CPT

## 2020-07-17 PROCEDURE — 80053 COMPREHEN METABOLIC PANEL: CPT

## 2020-07-19 LAB — SARS-COV-2, COV2NT: NOT DETECTED

## 2020-07-23 ENCOUNTER — HOSPITAL ENCOUNTER (OUTPATIENT)
Dept: PREADMISSION TESTING | Age: 75
Discharge: HOME | End: 2020-07-23
Payer: MEDICARE

## 2020-07-23 PROCEDURE — 87635 SARS-COV-2 COVID-19 AMP PRB: CPT

## 2020-07-23 RX ORDER — DIPHENHYDRAMINE HYDROCHLORIDE 50 MG/ML
50 INJECTION, SOLUTION INTRAMUSCULAR; INTRAVENOUS ONCE
Status: CANCELLED | OUTPATIENT
Start: 2020-07-23 | End: 2020-07-23

## 2020-07-23 RX ORDER — EPINEPHRINE 0.1 MG/ML
1 INJECTION INTRACARDIAC; INTRAVENOUS
Status: CANCELLED | OUTPATIENT
Start: 2020-07-23 | End: 2020-07-24

## 2020-07-23 RX ORDER — SODIUM CHLORIDE 0.9 % (FLUSH) 0.9 %
5-40 SYRINGE (ML) INJECTION AS NEEDED
Status: CANCELLED | OUTPATIENT
Start: 2020-07-23

## 2020-07-23 RX ORDER — DEXTROMETHORPHAN/PSEUDOEPHED 2.5-7.5/.8
1.2 DROPS ORAL
Status: CANCELLED | OUTPATIENT
Start: 2020-07-23

## 2020-07-23 RX ORDER — ATROPINE SULFATE 0.1 MG/ML
0.5 INJECTION INTRAVENOUS
Status: CANCELLED | OUTPATIENT
Start: 2020-07-23 | End: 2020-07-24

## 2020-07-23 RX ORDER — SODIUM CHLORIDE 0.9 % (FLUSH) 0.9 %
5-40 SYRINGE (ML) INJECTION EVERY 8 HOURS
Status: CANCELLED | OUTPATIENT
Start: 2020-07-23

## 2020-07-24 ENCOUNTER — HOSPITAL ENCOUNTER (OUTPATIENT)
Age: 75
Setting detail: OUTPATIENT SURGERY
Discharge: HOME | End: 2020-07-24
Attending: INTERNAL MEDICINE | Admitting: INTERNAL MEDICINE
Payer: MEDICARE

## 2020-07-24 VITALS
DIASTOLIC BLOOD PRESSURE: 62 MMHG | TEMPERATURE: 97.7 F | RESPIRATION RATE: 15 BRPM | HEART RATE: 80 BPM | WEIGHT: 234 LBS | BODY MASS INDEX: 43.06 KG/M2 | OXYGEN SATURATION: 97 % | SYSTOLIC BLOOD PRESSURE: 142 MMHG | HEIGHT: 62 IN

## 2020-07-24 LAB
GLUCOSE BLD STRIP.AUTO-MCNC: 142 MG/DL (ref 70–110)
SARS-COV-2, COV2NT: NOT DETECTED

## 2020-07-24 PROCEDURE — 74011250636 HC RX REV CODE- 250/636: Performed by: INTERNAL MEDICINE

## 2020-07-24 PROCEDURE — 77030040361 HC SLV COMPR DVT MDII -B: Performed by: INTERNAL MEDICINE

## 2020-07-24 PROCEDURE — 99153 MOD SED SAME PHYS/QHP EA: CPT | Performed by: INTERNAL MEDICINE

## 2020-07-24 PROCEDURE — 82962 GLUCOSE BLOOD TEST: CPT

## 2020-07-24 PROCEDURE — 88305 TISSUE EXAM BY PATHOLOGIST: CPT

## 2020-07-24 PROCEDURE — 77030013991 HC SNR POLYP ENDOSC BSC -A: Performed by: INTERNAL MEDICINE

## 2020-07-24 PROCEDURE — G0500 MOD SEDAT ENDO SERVICE >5YRS: HCPCS | Performed by: INTERNAL MEDICINE

## 2020-07-24 PROCEDURE — 76040000007: Performed by: INTERNAL MEDICINE

## 2020-07-24 RX ORDER — SODIUM CHLORIDE 9 MG/ML
125 INJECTION, SOLUTION INTRAVENOUS CONTINUOUS
Status: DISCONTINUED | OUTPATIENT
Start: 2020-07-24 | End: 2020-07-24 | Stop reason: HOSPADM

## 2020-07-24 RX ORDER — HYDROCHLOROTHIAZIDE 12.5 MG/1
25 CAPSULE ORAL DAILY
COMMUNITY

## 2020-07-24 RX ORDER — MIDAZOLAM HYDROCHLORIDE 1 MG/ML
.25-5 INJECTION, SOLUTION INTRAMUSCULAR; INTRAVENOUS
Status: DISCONTINUED | OUTPATIENT
Start: 2020-07-24 | End: 2020-07-24 | Stop reason: HOSPADM

## 2020-07-24 RX ORDER — BISMUTH SUBSALICYLATE 262 MG
1 TABLET,CHEWABLE ORAL DAILY
COMMUNITY

## 2020-07-24 RX ORDER — SODIUM CHLORIDE 9 MG/ML
1000 INJECTION, SOLUTION INTRAVENOUS CONTINUOUS
Status: DISCONTINUED | OUTPATIENT
Start: 2020-07-24 | End: 2020-07-24 | Stop reason: HOSPADM

## 2020-07-24 RX ORDER — FENTANYL CITRATE 50 UG/ML
100 INJECTION, SOLUTION INTRAMUSCULAR; INTRAVENOUS
Status: DISCONTINUED | OUTPATIENT
Start: 2020-07-24 | End: 2020-07-24 | Stop reason: HOSPADM

## 2020-07-24 RX ORDER — ATORVASTATIN CALCIUM 10 MG/1
10 TABLET, FILM COATED ORAL
COMMUNITY

## 2020-07-24 RX ORDER — FLUMAZENIL 0.1 MG/ML
0.2 INJECTION INTRAVENOUS
Status: DISCONTINUED | OUTPATIENT
Start: 2020-07-24 | End: 2020-07-24 | Stop reason: HOSPADM

## 2020-07-24 RX ORDER — AMLODIPINE BESYLATE 10 MG/1
5 TABLET ORAL
COMMUNITY

## 2020-07-24 RX ORDER — METFORMIN HYDROCHLORIDE 1000 MG/1
500 TABLET ORAL 2 TIMES DAILY WITH MEALS
COMMUNITY

## 2020-07-24 RX ORDER — LISINOPRIL 10 MG/1
10 TABLET ORAL DAILY
COMMUNITY

## 2020-07-24 RX ORDER — LEVOTHYROXINE SODIUM 100 UG/1
50 TABLET ORAL
COMMUNITY

## 2020-07-24 RX ORDER — CHOLECALCIFEROL (VITAMIN D3) 125 MCG
200 CAPSULE ORAL DAILY
COMMUNITY

## 2020-07-24 RX ORDER — NALOXONE HYDROCHLORIDE 0.4 MG/ML
0.4 INJECTION, SOLUTION INTRAMUSCULAR; INTRAVENOUS; SUBCUTANEOUS
Status: DISCONTINUED | OUTPATIENT
Start: 2020-07-24 | End: 2020-07-24 | Stop reason: HOSPADM

## 2020-07-24 RX ADMIN — SODIUM CHLORIDE 125 ML/HR: 900 INJECTION, SOLUTION INTRAVENOUS at 09:40

## 2020-07-24 NOTE — PERIOP NOTES
Face to Face  Discharged  instruction given to [facility], spouse  and agreed with the plan. Discharged includes diet, activity limitations , medication to continue and no f/u colonoscopy is necessary . D/c to home in stable condition with care of family.

## 2020-07-24 NOTE — DISCHARGE INSTRUCTIONS
Kristie Juárez  [id number]  1945    COLON DISCHARGE INSTRUCTIONS    Discomfort:  Redness at IV site- apply warm compress to area; if redness or soreness persist- contact your physician  There may be a slight amount of blood passed from the rectum  Gaseous discomfort- walking, belching will help relieve any discomfort  You may not operate a vehicle til the next day. You may not engage in an occupation involving machinery or appliances til the next day. You may not drink alcoholic beverages til the next day. DIET:   High fiber diet. ACTIVITY:  You may not  resume your normal daily activities til the next day. it is recommended that you spend the remainder of the day resting -  avoid any strenuous activity. CALL M.D.  IF ANY SIGN OF:   Increasing pain, nausea, vomiting  Abdominal distension (swelling)  New increased bleeding (oral or rectal)  Fever (chills)  Pain in chest area  Bloody discharge from nose or mouth  Shortness of breath    You may not  take any Advil, Aspirin, Ibuprofen, Motrin, Aleve, or Goodys for 7 days, ONLY  Tylenol as needed for pain. Post procedure diagnosis:  HEMORROIDS; REDUNDANT COLON; LOOPY AND FIXED COLON;     Follow-up Instructions: Your follow up colonoscopy is not necessary in 10 years at age 79 yo. We will notify you the results of your biopsy by letter within 2 weeks. Inga Little MD  July 24, 2020  DISCHARGE SUMMARY from Nurse    PATIENT INSTRUCTIONS:    After general anesthesia or intravenous sedation, for 24 hours or while taking prescription Narcotics:  · Limit your activities  · Do not drive and operate hazardous machinery  · Do not make important personal or business decisions  · Do  not drink alcoholic beverages  · If you have not urinated within 8 hours after discharge, please contact your surgeon on call.     Report the following to your surgeon:  · Excessive pain, swelling, redness or odor of or around the surgical area  · Temperature over 100.5  · Nausea and vomiting lasting longer than 4 hours or if unable to take medications  · Any signs of decreased circulation or nerve impairment to extremity: change in color, persistent  numbness, tingling, coldness or increase pain  · Any questions    What to do at Home:  Recommended activity: as above     If you experience any of the following symptoms as above , please follow up with Doctor Eduard dolan. *  Please give a list of your current medications to your Primary Care Provider. *  Please update this list whenever your medications are discontinued, doses are      changed, or new medications (including over-the-counter products) are added. *  Please carry medication information at all times in case of emergency situations. Learning About COVID-19 and Social Distancing  What is it? Social distancing means putting space between yourself and other people. The recommended distance is 6 feet, or about 2 meters. This also means staying away from any place where people may gather, such as marsh or other public gathering places. Why is it important? Social distancing is the best way to reduce the spread of COVID-19. This virus seems to spread from person to person through droplets from coughing and sneezing. So if you keep your distance from others, you're less likely to get it or spread it. And social distancing is important for everyone, not just those who are at high risk of infection, like older people. You might have the virus but not have symptoms. You could then give the infection to someone you come into contact with. How is it done? Putting 6 feet, or about 2 meters, between you and other people is the recommended distance. Also stay away from any place where people may gather, such as marsh or other public gathering places. So if possible:  · Work from home, and keep your kids at home. · Don't travel if you don't have to.  And avoid public transportation, ride-shares, and taxis unless you have no choice. · Limit shopping to essentials, like food and medicines. · Wear a cloth face cover if you have to go to a public place like the grocery store or pharmacy. · Don't eat in restaurants. (You can still get takeout or food deliveries.)  · Avoid crowds and busy places. Follow stay-at-home orders or other directions for your area. Current as of: May 8, 2020               Content Version: 12.5  © 2006-2020 [employer]. Care instructions adapted under license by [employer] (which disclaims liability or warranty for this information). If you have questions about a medical condition or this instruction, always ask your healthcare professional. [place] [address] any warranty or liability for your use of this information. These are general instructions for a healthy lifestyle:    No smoking/ No tobacco products/ Avoid exposure to second hand smoke  Surgeon General's Warning:  Quitting smoking now greatly reduces serious risk to your health. Obesity, smoking, and sedentary lifestyle greatly increases your risk for illness    A healthy diet, regular physical exercise & weight monitoring are important for maintaining a healthy lifestyle    You may be retaining fluid if you have a history of heart failure or if you experience any of the following symptoms:  Weight gain of 3 pounds or more overnight or 5 pounds in a week, increased swelling in our hands or feet or shortness of breath while lying flat in bed. Please call your doctor as soon as you notice any of these symptoms; do not wait until your next office visit. The discharge information has been reviewed with the patient and spouse. The patient and spouse verbalized understanding. Discharge medications reviewed with the patient and spouse and appropriate educational materials and side effects teaching were provided.   Patient armband removed and shredded    ___________________________________________________________________________________________________________________________________

## 2020-07-24 NOTE — H&P
Assessment/Plan  # Detail Type Description    1. Assessment Personal history of colonic polyps (Z86.010). Patient Plan 66 yr old female patient of Dr. Ceasar Burnett seen for hx of polyps.  had polyps removed by Dr Junior Lao 5 to 10 years ago. She has one bm twice a day and sometimes every 2 days. she denied having rectal bleeding abdominal pain. She has no fx hx of colon cancer. but her mother  of abdominal cancer at age 29 yo. 2 maternal aunt  of cancer not sure what kind. She has recurring  right side breast cancer the first one was 20 years ago treated w surgery and radiation. PMH: DM 15 years on oral medication, HTn but no CKD, CAD or CVA. She used to have a lot of heartburns used to take Nexium for few years but stopped it 4 years ago and doing well with occasional heartburns treated simply with [id number]. no N/V or dysphagia. she gained weight. PSH: right lumpectomy 22 years ago for breast cancer. Hysterectomy, bilateral knee replacement, carpal tunnel surgery. Left shoulder surgery x 2. Denied smoking or abusing alcohol  she is morbidly obese BMI >40  I explained to her the procedure of colonoscopy and the risks involved which include but not limited to reaction to sedation, bleeding, perforation, infection or missing a lesion if bowels are not well prepped or are unusually tortuous. she agreed to proceed with the procedure and answered her questions. I gave her the Suprep to clean her bowels. I advised her to take if needed extra laxatives for few days before because she is on the constipated side to assure adequate bowel prep. Told her not take her medications in the morning of the procedure because they would be flushed out with the prep but can take them more confidently after the procedure. I advised her to bring all her medication with her. 2. Assessment Gastroesophageal reflux disease (K21.9).     Patient Plan used to be more severe but now very rare without much trouble or dysphagia 3. Assessment Ca of upper-inner quadrant of right female breast (C50.211). Patient Plan recurring right breast cancer         4. Assessment Type 2 diabetes mellitus with both eyes affected by mild nonproliferative retinopathy without macular edema, without long-term current use of insulin (F01.6382). Patient Plan DM x 20 years on oral medication   To omit taking the medication  the dose the morning of the procedure because of the risk of hypoglycemia. To bring all his medications with him. This 66year old female presents for Hx polyp/colon cancer. History of Present Illness:  1. Hx polyp/colon cancer   Denies risk factors. Pertinent negatives include abdominal pain, change in bowel habits, change in stool caliber, constipation, decreased appetite, diarrhea, melena, nausea, rectal bleeding, vomiting, weight gain and weight loss. Additional information: No family history of colon cancer, No family history of Crohn's/colitis, No NSAID/ASA use and Patient does not remember last colonoscopy procedure but she does know polyps was removed. PROBLEM LIST:   Problem List reviewed. Problem Description Onset Date Chronic Clinical Status Notes   Type II diabetes mellitus w/o complication 62/61/1822 Y     Benign essential hypertension 09/20/2012 Y     Mixed hyperlipidemia 09/20/2012 Y     Hypothyroidism 09/20/2012 Y     Gastroesophageal reflux disease 09/20/2012 Y               PAST MEDICAL/SURGICAL HISTORY   (Detailed)    Disease/disorder Onset Date Management Date Comments     Knee replacement 2007      Hysterectomy 1984    Cancer, breast       Diabetes       GERD       Hyperlipidemia       Hypertension       Thyroid disease         Carpal tunnel release 06/13/2019      Carpal tunnel release 2016    Sleep apnea         GYNECOLOGIC HISTORY:  Patient is postmenopausal.   Postmenopausal age: 46. OBSTETRIC HISTORY:  Not currently pregnant.        Family History (Detailed)  Relationship Family Member Name  Age at Death Condition Onset Age Cause of Death   Brother  [de-identified] and well  N   Father    Cancer, prostate  N   Father    Alzheimer's Disease  N   Father    Hypertension  N   Maternal aunt    Diabetes mellitus  N   Maternal grandfather    Diabetes mellitus  N   Mother    Cancer, cervical 27 N     Family History Comments  Relationship Family Member Name Condition Comments   Mother  Cancer, cervical  SMB 04/10/2020 -Cx cause of death       Social History:  (Detailed)  Tobacco use reviewed. Preferred language is [place]. MARITAL STATUS/FAMILY/SOCIAL SUPPORT  Marital status:    Tobacco use status: Current non-smoker. Smoking status: Never smoker. TOBACCO SCREENING:  Patient has never used tobacco. Patient has not used tobacco in the last 30 days. Patient has not used smokeless tobacco in the last 30 days. SMOKING STATUS  Type Smoking Status Usage Per Day Years Used Pack Years Total Pack Years    Never smoker         TOBACCO/VAPING EXPOSURE  No passive smoke exposure. ALCOHOL  There is no history of alcohol use. CAFFEINE  The patient uses caffeine: coffee - 1 cup a day. LIFESTYLE  Sedentary activity level. Never exercises. DIET  diabetic.             Medications (active prior to today)  Medication Name Sig Description Start Date Stop Date Refilled Rx Elsewhere   CPAP  INHALATION GAS 5-15 pressures [facility] 2016   N   FreeStyle Lancets 28 gauge use daily to check sugar as directed 2017   N   FreeStyle Lite Strips use daily to check sugar as directed 10/24/2018  10/24/2018 N   levothyroxine 50 mcg tablet take 1 tablet by oral route  every day 2019 N   Hyzaar 100 mg-25 mg tablet take 1 tablet by oral route  every day 2019 N   atorvastatin 10 mg tablet take 1 tablet by oral route  every day 2019 N   Calcium 600 + D(3) 600 mg (1,500 mg)-400 unit tablet Take 1 tablet by oral route daily 04/04/2019 04/04/2019 N   meclizine 25 mg tablet take 1/2 -1 tablet by oral route  every 6 hours as needed for vertigo 08/15/2019  08/15/2019 N   amlodipine 5 mg tablet take 1 tablet by oral route  every day 12/10/2019  12/10/2019 N   metformin 500 mg tablet take 1 tablet by oral route 2 times every day with morning and evening meals 06/23/2020   N       Medications (Added, Continued or Stopped today)  Start Date Medication Directions PRN Status PRN Reason Instruction Stop Date   12/10/2019 amlodipine 5 mg tablet take 1 tablet by oral route  every day N      04/04/2019 atorvastatin 10 mg tablet take 1 tablet by oral route  every day N      04/04/2019 Calcium 600 + D(3) 600 mg (1,500 mg)-400 unit tablet Take 1 tablet by oral route daily N      11/17/2016 CPAP  INHALATION GAS 5-15 pressures [facility] N      12/13/2017 FreeStyle Lancets 28 gauge use daily to check sugar as directed N      10/24/2018 FreeStyle Lite Strips use daily to check sugar as directed N      06/30/2020 GaviLyte-N 420 gram oral solution take as prescribed by physician N      04/04/2019 Hyzaar 100 mg-25 mg tablet take 1 tablet by oral route  every day N      04/04/2019 levothyroxine 50 mcg tablet take 1 tablet by oral route  every day N      08/15/2019 meclizine 25 mg tablet take 1/2 -1 tablet by oral route  every 6 hours as needed for vertigo N      06/23/2020 metformin 500 mg tablet take 1 tablet by oral route 2 times every day with morning and evening meals N        Allergies:  Ingredient Reaction (Severity) Medication Name Comment   NO KNOWN ALLERGIES            Review of System  System Neg/Pos Details   Constitutional Negative Weight gain and Weight loss. GI Negative Abdominal pain, Change in bowel habits, Change in stool caliber, Constipation, Decreased appetite, Diarrhea, Melena, Nausea, Rectal bleeding and Vomiting. Reproductive Positive The patient is post-menopausal (Occurred at age 46).      Vital Signs   Gynecologic History  Patient is postmenopausal.    Height  Time ft in cm Last Measured Height Position   2:14 PM 5.0 1.50 156.21 04/10/2020 Standing     Date/Time  Temp  Pulse  BP  MAP (Calculated)  Arterial Line 1 BP (mmHg)  BP Patient Position  Resp  SpO2  O2 Device  O2 Flow Rate (L/min)  Pre/Post Ductal  Weight    07/24/20 1005  --  83  --  --  --  --  0Abnormal    99 %  --  --  --  --    07/24/20 0936  98.4 °F (36.9 °C)  90  158/74  102  --  --  14  100 %  Room air  --  --  106.1 kg (234 lb)        PHYSICAL EXAM:  Exam Findings Details   Constitutional * Nourishment - Morbidly obese BMI 43. Constitutional Normal Well developed. Eyes Normal Conjunctiva - Right: Normal, Left: Normal. Sclera - Right: Normal, Left: Normal.   Nasopharynx Normal Lips/teeth/gums - Normal. Buccal mucosa - Normal.   Neck Exam Normal Inspection - Normal. Palpation - Normal. Thyroid gland - Normal.   Respiratory Normal Inspection - Normal. Auscultation - Normal.   Cardiovascular Normal Regular rate and rhythm. No murmurs, gallops, or rubs. Abdomen * Obese. Inspection - midline lower, surgical scar(s). Abdomen Normal Appliance(s) - None. Abdominal muscles - Normal. Auscultation - Normal. Percussion - Normal. Anterior palpation - Normal, No guarding. Umbilicus - Normal. Abdominal reflexes - Normal. No abdominal tenderness. No hepatic enlargement. No spleen enlargement. No hepatojugular reflux. No hernia. No ascites. Kahn's sign - Normal. No hepatic tenderness. No hepatic bruit. Genitourinary Female * Rectal deferred. Genitourinary Female Normal No suprapubic tenderness. No CVA tenderness. No flank mass. Skin Normal Inspection - Normal.   Musculoskeletal Normal Hands/Wrist - Right: Normal, Left: Normal.   Extremity Normal No edema. Neurological Normal Fine motor skills - Normal.   Psychiatric Normal Orientation - Oriented to time, place, person & situation. Appropriate mood and affect.        No change  In H&P

## 2020-07-24 NOTE — PROCEDURES
[facility]  Colonoscopy Procedure Report  _______________________________________________________  Patient: Colleen Hernandez                                        Attending Physician: Jessica Gilliam MD    Patient ID: [id number]                                    Referring Physician: James Meza MD    Exam Date: 2020      Introduction: A  66 y.o. female patient, presents for inpatient Colonoscopy    Indications: patient of Dr. Radha Cobos seen for hx of polyps removed by Dr Deedra Bernheim 5 to 10 years ago. She has one bm twice a day and sometimes every 2 days. she denied having rectal bleeding abdominal pain. She has no fx hx of colon cancer. but her mother  of abdominal cancer at age 27 yo. 2 maternal aunt  of cancer not sure what kind. She has recurring  right side breast cancer the first one was 20 years ago treated w surgery and radiation. PMH: DM 15 years on oral medication, HTN, Morbid obese BMI >40 but no CKD, CAD or CVA. PSH: right lumpectomy 22 years ago for breast cancer. Hysterectomy, bilateral knee replacement, carpal tunnel surgery. Left shoulder surgery x 2. Denied smoking or abusing alcohol. She used to have a lot of heartburns used to take Nexium for few years but stopped it 4 years ago and doing well with occasional heartburns treated simply with alkaslzer. no N/V or dysphagia. she gained weight. Consent: The benefits, risks, and alternatives to the procedure were discussed and informed consent was obtained from the patient. Preparation: EKG, pulse, pulse oximetry and blood pressure were monitored throughout the procedure. ASA Classification: Class III- . The heart is an S1-S2 and regular heart rate and rhythm. Lungs are clear to auscultation and percussion. Abdomen is soft, nondistended, and nontender.  Long mid lower abdominalscar  Mental Status: awake, alert, and oriented to person, place, and time    Medications:  · Fentanyl 150 mcg IV and Versed 5 mg IV throughout the procedure. Rectal Exam: Normal Rectal Exam except for slightly decreased anal sphincter tone. No Blood. Pathology Specimens:  1    Procedure: The colonoscope was passed with difficulty through the anus under direct visualization and advanced to the cecum and 5 cm inside the terminal ileum. The patient required positioning on the back to aid in the passage of the scope. The scope was withdrawn and the mucosa was carefully examined. The quality of the preparation was excellent. The views were excellent. The patient's toleration of the procedure was good. The exam was done twice to the cecum and retroflexion done in the right colon. Total time is 21 minutes and withdrawal time is 14 minutes. Findings:    Rectum:   Small internal hemorrhoids. Sigmoid:   Loopy and fixed sigmoid  Descending Colon:   Normal   Transverse Colon:   Normal   Ascending Colon:   Normal   Cecum:   4 mm sessile polyp in the cecum cold snared  Terminal Ileum:   Normal      Unplanned Events: There were no unplanned events. Estimated Blood Loss: None  Impressions: Difficult colonoscopy because loopy and fixed colon from adhesions. Slightly decreased anal sphincter tone. Small internal hemorrhoids. Slightly tortuous, Loopy and fixed sigmoid  4 mm sessile polyp in the cecum cold snared. Normal Mucosa. No blood, diverticula or AVM found. Complications: None; patient tolerated the procedure well. Recommendations:  · Discharge home when standard parameters are met. · Resume a high fiber diet. · Resume own medications. Avoid all NSAID's for  · FU Colonoscopy not needed in 10 years.   · Take Miralax and/ or Colace 100 mg on regular basis if constipated    Procedure Codes:    · Jc Perla [[id number]]    Endoscope Information:  Model Number(s)    [id number]   Assistant: None  Signed By: Brianna Lauren MD Date: 7/24/2020

## 2020-07-28 ENCOUNTER — HOSPITAL ENCOUNTER (OUTPATIENT)
Dept: NON INVASIVE DIAGNOSTICS | Age: 75
Discharge: HOME | End: 2020-07-28
Payer: MEDICARE

## 2020-07-28 LAB
ATRIAL RATE: 73 BPM
CALCULATED P AXIS, ECG09: 63 DEGREES
CALCULATED R AXIS, ECG10: 58 DEGREES
CALCULATED T AXIS, ECG11: 54 DEGREES
DIAGNOSIS, 93000: NORMAL
P-R INTERVAL, ECG05: 156 MS
Q-T INTERVAL, ECG07: 390 MS
QRS DURATION, ECG06: 88 MS
QTC CALCULATION (BEZET), ECG08: 429 MS
VENTRICULAR RATE, ECG03: 73 BPM

## 2020-07-28 PROCEDURE — 93005 ELECTROCARDIOGRAM TRACING: CPT

## 2020-07-29 ENCOUNTER — ANESTHESIA EVENT (OUTPATIENT)
Dept: SURGERY | Age: 75
End: 2020-07-29
Payer: MEDICARE

## 2020-07-30 ENCOUNTER — HOSPITAL ENCOUNTER (OUTPATIENT)
Age: 75
Discharge: HOME | End: 2020-07-31
Attending: SURGERY | Admitting: SURGERY
Payer: MEDICARE

## 2020-07-30 ENCOUNTER — APPOINTMENT (OUTPATIENT)
Dept: NUCLEAR MEDICINE | Age: 75
End: 2020-07-30
Attending: SURGERY
Payer: MEDICARE

## 2020-07-30 ENCOUNTER — ANESTHESIA (OUTPATIENT)
Dept: SURGERY | Age: 75
End: 2020-07-30
Payer: MEDICARE

## 2020-07-30 DIAGNOSIS — C50.911 MALIGNANT NEOPLASM OF RIGHT BREAST (HCC): ICD-10-CM

## 2020-07-30 DIAGNOSIS — Z90.11 S/P RIGHT MASTECTOMY: Primary | ICD-10-CM

## 2020-07-30 LAB
EST. AVERAGE GLUCOSE BLD GHB EST-MCNC: 151 MG/DL
GLUCOSE BLD STRIP.AUTO-MCNC: 145 MG/DL (ref 70–110)
GLUCOSE BLD STRIP.AUTO-MCNC: 152 MG/DL (ref 70–110)
GLUCOSE BLD STRIP.AUTO-MCNC: 169 MG/DL (ref 70–110)
HBA1C MFR BLD: 6.9 % (ref 4.2–5.6)

## 2020-07-30 PROCEDURE — 77030002916 HC SUT ETHLN J&J -A: Performed by: SURGERY

## 2020-07-30 PROCEDURE — 76210000016 HC OR PH I REC 1 TO 1.5 HR: Performed by: SURGERY

## 2020-07-30 PROCEDURE — 88331 PATH CONSLTJ SURG 1 BLK 1SPC: CPT

## 2020-07-30 PROCEDURE — 74011250636 HC RX REV CODE- 250/636: Performed by: SURGERY

## 2020-07-30 PROCEDURE — 76010000153 HC OR TIME 1.5 TO 2 HR: Performed by: SURGERY

## 2020-07-30 PROCEDURE — 77030008462 HC STPLR SKN PROX J&J -A: Performed by: SURGERY

## 2020-07-30 PROCEDURE — 88309 TISSUE EXAM BY PATHOLOGIST: CPT

## 2020-07-30 PROCEDURE — 77030010507 HC ADH SKN DERMBND J&J -B: Performed by: SURGERY

## 2020-07-30 PROCEDURE — 77030002933 HC SUT MCRYL J&J -A: Performed by: SURGERY

## 2020-07-30 PROCEDURE — 74011000250 HC RX REV CODE- 250: Performed by: ANESTHESIOLOGY

## 2020-07-30 PROCEDURE — 77030020782 HC GWN BAIR PAWS FLX 3M -B: Performed by: SURGERY

## 2020-07-30 PROCEDURE — 76060000034 HC ANESTHESIA 1.5 TO 2 HR: Performed by: SURGERY

## 2020-07-30 PROCEDURE — 99218 HC RM OBSERVATION: CPT

## 2020-07-30 PROCEDURE — 77030011825 HC SUPP SURG PSTOP S2SG -B: Performed by: SURGERY

## 2020-07-30 PROCEDURE — 77030011265 HC ELECTRD BLD HEX COVD -A: Performed by: SURGERY

## 2020-07-30 PROCEDURE — 77030031140 HC SUT VCRL3 J&J -A: Performed by: SURGERY

## 2020-07-30 PROCEDURE — 77030002996 HC SUT SLK J&J -A: Performed by: SURGERY

## 2020-07-30 PROCEDURE — 36415 COLL VENOUS BLD VENIPUNCTURE: CPT

## 2020-07-30 PROCEDURE — 74011000636 HC RX REV CODE- 636: Performed by: SURGERY

## 2020-07-30 PROCEDURE — 77030012508 HC MSK AIRWY LMA AMBU -A: Performed by: ANESTHESIOLOGY

## 2020-07-30 PROCEDURE — 83036 HEMOGLOBIN GLYCOSYLATED A1C: CPT

## 2020-07-30 PROCEDURE — 74011000250 HC RX REV CODE- 250: Performed by: SURGERY

## 2020-07-30 PROCEDURE — 77030040506 HC DRN WND MDII -A: Performed by: SURGERY

## 2020-07-30 PROCEDURE — 74011636637 HC RX REV CODE- 636/637: Performed by: ANESTHESIOLOGY

## 2020-07-30 PROCEDURE — 82962 GLUCOSE BLOOD TEST: CPT

## 2020-07-30 PROCEDURE — 77030040504 HC DRN WND MDII -B: Performed by: SURGERY

## 2020-07-30 PROCEDURE — 74011250636 HC RX REV CODE- 250/636: Performed by: ANESTHESIOLOGY

## 2020-07-30 PROCEDURE — 88307 TISSUE EXAM BY PATHOLOGIST: CPT

## 2020-07-30 PROCEDURE — 77030031753 HC SHR ENDO COAG HARM J&J -E: Performed by: SURGERY

## 2020-07-30 PROCEDURE — 77030031139 HC SUT VCRL2 J&J -A: Performed by: SURGERY

## 2020-07-30 PROCEDURE — A9541 TC99M SULFUR COLLOID: HCPCS

## 2020-07-30 PROCEDURE — 74011250637 HC RX REV CODE- 250/637: Performed by: SURGERY

## 2020-07-30 RX ORDER — MORPHINE SULFATE 2 MG/ML
1 INJECTION, SOLUTION INTRAMUSCULAR; INTRAVENOUS
Status: DISCONTINUED | OUTPATIENT
Start: 2020-07-30 | End: 2020-07-31

## 2020-07-30 RX ORDER — NALOXONE HYDROCHLORIDE 0.4 MG/ML
0.4 INJECTION, SOLUTION INTRAMUSCULAR; INTRAVENOUS; SUBCUTANEOUS AS NEEDED
Status: DISCONTINUED | OUTPATIENT
Start: 2020-07-30 | End: 2020-07-30 | Stop reason: HOSPADM

## 2020-07-30 RX ORDER — KETAMINE HYDROCHLORIDE 10 MG/ML
INJECTION, SOLUTION INTRAMUSCULAR; INTRAVENOUS AS NEEDED
Status: DISCONTINUED | OUTPATIENT
Start: 2020-07-30 | End: 2020-07-30 | Stop reason: HOSPADM

## 2020-07-30 RX ORDER — ONDANSETRON 2 MG/ML
4 INJECTION INTRAMUSCULAR; INTRAVENOUS ONCE
Status: DISCONTINUED | OUTPATIENT
Start: 2020-07-30 | End: 2020-07-30 | Stop reason: HOSPADM

## 2020-07-30 RX ORDER — AMLODIPINE BESYLATE 5 MG/1
5 TABLET ORAL
Status: DISCONTINUED | OUTPATIENT
Start: 2020-07-30 | End: 2020-07-31 | Stop reason: HOSPADM

## 2020-07-30 RX ORDER — SODIUM CHLORIDE, SODIUM LACTATE, POTASSIUM CHLORIDE, CALCIUM CHLORIDE 600; 310; 30; 20 MG/100ML; MG/100ML; MG/100ML; MG/100ML
125 INJECTION, SOLUTION INTRAVENOUS CONTINUOUS
Status: DISCONTINUED | OUTPATIENT
Start: 2020-07-30 | End: 2020-07-31

## 2020-07-30 RX ORDER — ISOSULFAN BLUE 50 MG/5ML
INJECTION, SOLUTION SUBCUTANEOUS AS NEEDED
Status: DISCONTINUED | OUTPATIENT
Start: 2020-07-30 | End: 2020-07-30 | Stop reason: HOSPADM

## 2020-07-30 RX ORDER — FLUMAZENIL 0.1 MG/ML
0.2 INJECTION INTRAVENOUS
Status: DISCONTINUED | OUTPATIENT
Start: 2020-07-30 | End: 2020-07-30 | Stop reason: HOSPADM

## 2020-07-30 RX ORDER — ATORVASTATIN CALCIUM 10 MG/1
10 TABLET, FILM COATED ORAL
Status: DISCONTINUED | OUTPATIENT
Start: 2020-07-30 | End: 2020-07-31 | Stop reason: HOSPADM

## 2020-07-30 RX ORDER — LEVOTHYROXINE SODIUM 50 UG/1
50 TABLET ORAL
Status: DISCONTINUED | OUTPATIENT
Start: 2020-07-31 | End: 2020-07-31 | Stop reason: HOSPADM

## 2020-07-30 RX ORDER — LIDOCAINE HYDROCHLORIDE 20 MG/ML
INJECTION, SOLUTION EPIDURAL; INFILTRATION; INTRACAUDAL; PERINEURAL AS NEEDED
Status: DISCONTINUED | OUTPATIENT
Start: 2020-07-30 | End: 2020-07-30 | Stop reason: HOSPADM

## 2020-07-30 RX ORDER — ONDANSETRON 2 MG/ML
INJECTION INTRAMUSCULAR; INTRAVENOUS AS NEEDED
Status: DISCONTINUED | OUTPATIENT
Start: 2020-07-30 | End: 2020-07-30 | Stop reason: HOSPADM

## 2020-07-30 RX ORDER — FERROUS SULFATE, DRIED 160(50) MG
1 TABLET, EXTENDED RELEASE ORAL
Status: DISCONTINUED | OUTPATIENT
Start: 2020-07-31 | End: 2020-07-31 | Stop reason: HOSPADM

## 2020-07-30 RX ORDER — HYDROCHLOROTHIAZIDE 12.5 MG/1
25 CAPSULE ORAL DAILY
Status: DISCONTINUED | OUTPATIENT
Start: 2020-07-31 | End: 2020-07-31 | Stop reason: HOSPADM

## 2020-07-30 RX ORDER — BUPIVACAINE HYDROCHLORIDE AND EPINEPHRINE 2.5; 5 MG/ML; UG/ML
INJECTION, SOLUTION EPIDURAL; INFILTRATION; INTRACAUDAL; PERINEURAL AS NEEDED
Status: DISCONTINUED | OUTPATIENT
Start: 2020-07-30 | End: 2020-07-30 | Stop reason: HOSPADM

## 2020-07-30 RX ORDER — OXYCODONE AND ACETAMINOPHEN 5; 325 MG/1; MG/1
1 TABLET ORAL
Status: DISCONTINUED | OUTPATIENT
Start: 2020-07-30 | End: 2020-07-31 | Stop reason: HOSPADM

## 2020-07-30 RX ORDER — KETOROLAC TROMETHAMINE 15 MG/ML
INJECTION, SOLUTION INTRAMUSCULAR; INTRAVENOUS AS NEEDED
Status: DISCONTINUED | OUTPATIENT
Start: 2020-07-30 | End: 2020-07-30 | Stop reason: HOSPADM

## 2020-07-30 RX ORDER — FENTANYL CITRATE 50 UG/ML
50 INJECTION, SOLUTION INTRAMUSCULAR; INTRAVENOUS
Status: DISCONTINUED | OUTPATIENT
Start: 2020-07-30 | End: 2020-07-30 | Stop reason: HOSPADM

## 2020-07-30 RX ORDER — CEFAZOLIN SODIUM 2 G/50ML
2 SOLUTION INTRAVENOUS ONCE
Status: COMPLETED | OUTPATIENT
Start: 2020-07-30 | End: 2020-07-30

## 2020-07-30 RX ORDER — OXYCODONE AND ACETAMINOPHEN 5; 325 MG/1; MG/1
1 TABLET ORAL
Status: DISCONTINUED | OUTPATIENT
Start: 2020-07-30 | End: 2020-07-30 | Stop reason: HOSPADM

## 2020-07-30 RX ORDER — SODIUM CHLORIDE, SODIUM LACTATE, POTASSIUM CHLORIDE, CALCIUM CHLORIDE 600; 310; 30; 20 MG/100ML; MG/100ML; MG/100ML; MG/100ML
100 INJECTION, SOLUTION INTRAVENOUS CONTINUOUS
Status: DISCONTINUED | OUTPATIENT
Start: 2020-07-30 | End: 2020-07-30 | Stop reason: HOSPADM

## 2020-07-30 RX ORDER — METFORMIN HYDROCHLORIDE 500 MG/1
500 TABLET ORAL 2 TIMES DAILY WITH MEALS
Status: DISCONTINUED | OUTPATIENT
Start: 2020-07-30 | End: 2020-07-31 | Stop reason: HOSPADM

## 2020-07-30 RX ORDER — ONDANSETRON 2 MG/ML
8 INJECTION INTRAMUSCULAR; INTRAVENOUS
Status: DISCONTINUED | OUTPATIENT
Start: 2020-07-30 | End: 2020-07-31

## 2020-07-30 RX ORDER — HYDROMORPHONE HYDROCHLORIDE 1 MG/ML
0.5 INJECTION, SOLUTION INTRAMUSCULAR; INTRAVENOUS; SUBCUTANEOUS
Status: DISCONTINUED | OUTPATIENT
Start: 2020-07-30 | End: 2020-07-30 | Stop reason: HOSPADM

## 2020-07-30 RX ORDER — MIDAZOLAM HYDROCHLORIDE 1 MG/ML
INJECTION, SOLUTION INTRAMUSCULAR; INTRAVENOUS AS NEEDED
Status: DISCONTINUED | OUTPATIENT
Start: 2020-07-30 | End: 2020-07-30 | Stop reason: HOSPADM

## 2020-07-30 RX ORDER — INSULIN LISPRO 100 [IU]/ML
INJECTION, SOLUTION INTRAVENOUS; SUBCUTANEOUS ONCE
Status: COMPLETED | OUTPATIENT
Start: 2020-07-30 | End: 2020-07-30

## 2020-07-30 RX ORDER — PROPOFOL 10 MG/ML
INJECTION, EMULSION INTRAVENOUS AS NEEDED
Status: DISCONTINUED | OUTPATIENT
Start: 2020-07-30 | End: 2020-07-30 | Stop reason: HOSPADM

## 2020-07-30 RX ORDER — MAGNESIUM SULFATE 100 %
4 CRYSTALS MISCELLANEOUS AS NEEDED
Status: DISCONTINUED | OUTPATIENT
Start: 2020-07-30 | End: 2020-07-30

## 2020-07-30 RX ORDER — LISINOPRIL 5 MG/1
10 TABLET ORAL DAILY
Status: DISCONTINUED | OUTPATIENT
Start: 2020-07-31 | End: 2020-07-31 | Stop reason: HOSPADM

## 2020-07-30 RX ORDER — FENTANYL CITRATE 50 UG/ML
INJECTION, SOLUTION INTRAMUSCULAR; INTRAVENOUS AS NEEDED
Status: DISCONTINUED | OUTPATIENT
Start: 2020-07-30 | End: 2020-07-30 | Stop reason: HOSPADM

## 2020-07-30 RX ADMIN — PROPOFOL 150 MG: 10 INJECTION, EMULSION INTRAVENOUS at 09:27

## 2020-07-30 RX ADMIN — SODIUM CHLORIDE, SODIUM LACTATE, POTASSIUM CHLORIDE, AND CALCIUM CHLORIDE 125 ML/HR: 600; 310; 30; 20 INJECTION, SOLUTION INTRAVENOUS at 13:05

## 2020-07-30 RX ADMIN — SODIUM CHLORIDE, SODIUM LACTATE, POTASSIUM CHLORIDE, AND CALCIUM CHLORIDE: 600; 310; 30; 20 INJECTION, SOLUTION INTRAVENOUS at 10:23

## 2020-07-30 RX ADMIN — AMLODIPINE BESYLATE 5 MG: 5 TABLET ORAL at 22:27

## 2020-07-30 RX ADMIN — PHENYLEPHRINE HYDROCHLORIDE 100 MCG: 10 INJECTION INTRAVENOUS at 09:49

## 2020-07-30 RX ADMIN — PHENYLEPHRINE HYDROCHLORIDE 100 MCG: 10 INJECTION INTRAVENOUS at 10:27

## 2020-07-30 RX ADMIN — KETOROLAC TROMETHAMINE 15 MG: 15 INJECTION, SOLUTION INTRAMUSCULAR; INTRAVENOUS at 10:49

## 2020-07-30 RX ADMIN — INSULIN LISPRO 2 UNITS: 100 INJECTION, SOLUTION INTRAVENOUS; SUBCUTANEOUS at 11:46

## 2020-07-30 RX ADMIN — SODIUM CHLORIDE, SODIUM LACTATE, POTASSIUM CHLORIDE, AND CALCIUM CHLORIDE 125 ML/HR: 600; 310; 30; 20 INJECTION, SOLUTION INTRAVENOUS at 07:28

## 2020-07-30 RX ADMIN — FENTANYL CITRATE 25 MCG: 50 INJECTION, SOLUTION INTRAMUSCULAR; INTRAVENOUS at 09:44

## 2020-07-30 RX ADMIN — MIDAZOLAM 2 MG: 1 INJECTION INTRAMUSCULAR; INTRAVENOUS at 09:19

## 2020-07-30 RX ADMIN — FENTANYL CITRATE 25 MCG: 50 INJECTION, SOLUTION INTRAMUSCULAR; INTRAVENOUS at 10:02

## 2020-07-30 RX ADMIN — PHENYLEPHRINE HYDROCHLORIDE 100 MCG: 10 INJECTION INTRAVENOUS at 09:45

## 2020-07-30 RX ADMIN — KETAMINE HYDROCHLORIDE 10 MG: 10 INJECTION, SOLUTION INTRAMUSCULAR; INTRAVENOUS at 09:57

## 2020-07-30 RX ADMIN — PHENYLEPHRINE HYDROCHLORIDE 100 MCG: 10 INJECTION INTRAVENOUS at 10:17

## 2020-07-30 RX ADMIN — METFORMIN HYDROCHLORIDE 500 MG: 500 TABLET ORAL at 17:58

## 2020-07-30 RX ADMIN — KETAMINE HYDROCHLORIDE 30 MG: 10 INJECTION, SOLUTION INTRAMUSCULAR; INTRAVENOUS at 09:27

## 2020-07-30 RX ADMIN — FENTANYL CITRATE 25 MCG: 50 INJECTION, SOLUTION INTRAMUSCULAR; INTRAVENOUS at 09:54

## 2020-07-30 RX ADMIN — KETAMINE HYDROCHLORIDE 10 MG: 10 INJECTION, SOLUTION INTRAMUSCULAR; INTRAVENOUS at 09:44

## 2020-07-30 RX ADMIN — PHENYLEPHRINE HYDROCHLORIDE 100 MCG: 10 INJECTION INTRAVENOUS at 09:40

## 2020-07-30 RX ADMIN — PHENYLEPHRINE HYDROCHLORIDE 100 MCG: 10 INJECTION INTRAVENOUS at 09:29

## 2020-07-30 RX ADMIN — FENTANYL CITRATE 25 MCG: 50 INJECTION, SOLUTION INTRAMUSCULAR; INTRAVENOUS at 10:31

## 2020-07-30 RX ADMIN — ONDANSETRON HYDROCHLORIDE 4 MG: 2 INJECTION INTRAMUSCULAR; INTRAVENOUS at 10:45

## 2020-07-30 RX ADMIN — ATORVASTATIN CALCIUM 10 MG: 10 TABLET, FILM COATED ORAL at 22:25

## 2020-07-30 RX ADMIN — CEFAZOLIN SODIUM 2 G: 2 SOLUTION INTRAVENOUS at 09:31

## 2020-07-30 RX ADMIN — LIDOCAINE HYDROCHLORIDE 100 MG: 20 INJECTION, SOLUTION EPIDURAL; INFILTRATION; INTRACAUDAL; PERINEURAL at 09:27

## 2020-07-30 NOTE — PERIOP NOTES
TRANSFER - IN REPORT:    Verbal report received from ISAEL & Dr. Ryan Luther on Marian Heredia  being received from OR (unit) for routine post - op      Report consisted of patients Situation, Background, Assessment and   Recommendations(SBAR). Information from the following report(s) SBAR was reviewed with the receiving nurse. Opportunity for questions and clarification was provided. Assessment completed upon patients arrival to unit and care assumed.

## 2020-07-30 NOTE — INTERVAL H&P NOTE
Update History & Physical    The Patient's History and Physical of July 6, 2020 was reviewed with the patient and I examined the patient. There was no change. The surgical site was confirmed by the patient and me. Plan:  The risk, benefits, expected outcome, and alternative to the recommended procedure have been discussed with the patient. Patient understands and wants to proceed with the procedure.     Electronically signed by Barbara Good MD on 7/30/2020 at 9:19 AM

## 2020-07-30 NOTE — ANESTHESIA POSTPROCEDURE EVALUATION
Post-Anesthesia Evaluation & Assessment    Visit Vitals  /55   Pulse 74   Temp 36.2 °C (97.2 °F)   Resp 20   Ht 5' 2\" (1.575 m)   Wt 105.8 kg (233 lb 4 oz)   SpO2 96%   BMI 42.66 kg/m²       Nausea/Vomiting: Controlled. Post-operative hydration adequate. Pain Scale 1: FLACC (07/30/20 1232)  Pain Intensity 1: 0 (07/30/20 1232)   Managed    Pain score at or below stated goal level. Mental status & Level of consciousness: alert and oriented x 3    Neurological status: moves all extremities, sensation grossly intact    Pulmonary status: airway patent, adequate oxygenation. Complications related to anesthesia: none    Patient has met all PACU discharge requirements.       Tegan Lees, DO

## 2020-07-30 NOTE — BRIEF OP NOTE
Brief Postoperative Note    Patient: Giana Aldrich  YOB: 1945  MRN: [id number]    Date of Procedure: 7/30/2020     Pre-Op Diagnosis: RECURRENT RIGHT BREAST CANCER    Post-Op Diagnosis: Same as preoperative diagnosis.       Procedure(s):  RIGHT MASTECTOMY, INTRADERMAL AREOLAR INJECTION OF LYMPHAZURIN BLUE DYE FOR LYMPH NODE LOCALIZATION, RIGHT AXILLARY SENTINEL LYMPH NODE BIOPSY X 2 (LEVEL 1 & LEVEL 2 AXILLARY LYMPH NODES)    Surgeon(s):  Chong Steve MD    Surgical Assistant: None    Anesthesia: General     Estimated Blood Loss (mL): Minimal    Complications: None    Specimens:   ID Type Source Tests Collected by Time Destination   1 : BREAST, RIGHT Preservative Breast  Chong Steve MD 7/30/2020 4892 Pathology   2 : SENTINEL LYMPH NODE #1 Frozen Section Lymph Node  Chong Steve MD 7/30/2020 1008 Pathology   3 : SENTINEL LYMPH NODE #2 Frozen Section Lymph Node  Chong Steve MD 7/30/2020 1015 Pathology        Implants: * No implants in log *    Drains:   Daniel-Hunter Drain 07/30/20 Right Breast (Active)       Findings: N/A    Electronically Signed by Santos Ghosh MD on 7/30/2020 at 11:24 AM    Op note dictated #[id number]  RP

## 2020-07-30 NOTE — PERIOP NOTES
Reviewed PTA medication list with patient/caregiver and patient/caregiver denies any additional medications. Patient admits to having a responsible adult care for them for at least 24 hours after surgery.     Permission granted by patient for a dual skin assessment. Dual skin assessment completed by Mabel Vang RN and Miriam DUGGAN.

## 2020-07-30 NOTE — PROGRESS NOTES
Transition of Care (KORIN) Plan:    Home with physician follow up vs [facility] and physician follow up    Chart reviewed. Pt admitted for an elective surgical procedure (RIGHT MASTECTOMY, INTRADERMAL AREOLAR INJECTION OF LYMPHAZURIN BLUE DYE FOR LYMPH NODE LOCALIZATION, RIGHT AXILLARY SENTINEL LYMPH NODE BIOPSY X 2 (LEVEL 1 & LEVEL 2 AXILLARY LYMPH NODES). Pt is independent. Please encourage ambulation. No transition of care needs identified at this time. Anticipate pt will be medically stable for discharge within the next 24-48 hours with physician follow up. CM available to assist as needed. [facility]OR[facility] Transportation:   How is patient being transported at discharge? Family/Friend      When? Once cleared by physician     Is transport scheduled? N/A      Follow-up appointment and transportation:   PCP/Specialist?  See AVS for Appointment         Who is transporting to the follow-up appointment? Self/Family/Friend      Is transport for follow up appointment scheduled? N/A    Communication plan (with patient/family): Who is being called? Patient or Next of Kin? Responsible party? Patient      What number(s) is to be used? See Facesheet      What service provider is calling for [facility] services? When are they calling? Readmission Risk? (Green/Low; Yellow/Moderate; Red/High):  Green    Care Management Interventions  Mode of Transport at Discharge:  Other (see comment)(Family)  Transition of Care Consult (CM Consult): Discharge Planning  Health Maintenance Reviewed: Yes  Current Support Network: Lives with Spouse  Confirm Follow Up Transport: Family  The Plan for Transition of Care is Related to the Following Treatment Goals : Home with physician follow up vs [facility] and physician follow up   Discharge Location  Discharge Placement: Home with family assistance(vs )

## 2020-07-30 NOTE — PROGRESS NOTES
07/30/20 1338   Vital Signs   Temp 98.8 °F (37.1 °C)   Temp Source Oral   Pulse (Heart Rate) 78   Heart Rate Source Monitor   Resp Rate 16   O2 Sat (%) 100 %   /59   MAP (Calculated) 79   BP 1 Method Automatic   BP 1 Location Left arm   BP Patient Position At rest   Pain 1   Pain Scale 1 Numeric (0 - 10)   Pain Intensity 1 0   Patient Stated Pain Goal 0       S: Patient admission from PACU. B: At 1238 pm, PACU Nurse Iam Sparrow contacted unit and provided the following telephone report for patient. She verbalized that mastectomy of the right breast with dry dressing intact and right sided EVERT Drain. Additionally she stated that patient  Had biopsy of lymph nodes of right axilla and also presented with a past medical history of cancer, diabetes mellitus, hypertension, Sleep apnea and cerebrovascular accident as well as hypothyroidism. She verbalized that patient's last set of vital signs were (P: 79 RR: 20 BP: 119/56 SPO2: 95% on 2 Liters oxygen) and a blood sugar level of 169. Understanding and acceptance of discharge report was verbalized prior to patient presenting to unit. A: At 1338 pm, patient presented to unit awake, alert and stable with the following vital signs listed above and a Blood sugar level of 152. Bed alarm was activated and bedside rails were advanced to ensure patient safety prior to leaving room. Additionally, patient was placed on sequential compression devices as anticoagulant therapy on bilateral lower extremities. Additionally patient was started IV Lactated Ringer at 125 cc/hr via left arm 20 gauge IV. Patient had limb alert placed on right lower arm due to right sided mastectomy. R: Will continue with prescribed plan of care as directed.    Jeff Baker  7/30/2020  2:24 PM

## 2020-07-30 NOTE — ROUTINE PROCESS
TRANSFER - OUT REPORT:    Verbal report given to Blair Brooke on Diamond Mcgovern  being transferred to [facility] for ordered procedure       Report consisted of patients Situation, Background, Assessment and   Recommendations(SBAR). Information from the following report(s) SBAR was reviewed with the receiving nurse. Lines:   Peripheral IV 07/30/20 Left Hand (Active)   Site Assessment Clean, dry, & intact 07/30/20 0720   Phlebitis Assessment 0 07/30/20 0720   Infiltration Assessment 0 07/30/20 0720   Dressing Status Clean, dry, & intact 07/30/20 0720   Dressing Type Transparent;Tape 07/30/20 0720   Hub Color/Line Status Patent; Infusing;Pink 07/30/20 0720        Opportunity for questions and clarification was provided.       Patient transported with:   R[employer]

## 2020-07-30 NOTE — ANESTHESIA PREPROCEDURE EVALUATION
Relevant Problems   No relevant active problems       Anesthetic History   No history of anesthetic complications            Review of Systems / Medical History  Patient summary reviewed, nursing notes reviewed and pertinent labs reviewed    Pulmonary        Sleep apnea: CPAP      Pertinent negatives: No COPD, asthma, recent URI and smoker     Neuro/Psych       CVA: no residual symptoms  TIA  Pertinent negatives: No seizures and neuromuscular disease   Cardiovascular    Hypertension: well controlled            Pertinent negatives: No past MI, CAD, PAD, dysrhythmias, angina and CHF  Exercise tolerance: >4 METS     GI/Hepatic/Renal  Within defined limits              Endo/Other    Diabetes  Hypothyroidism: well controlled  Morbid obesity  Pertinent negatives: No hyperthyroidism and blood dyscrasia   Other Findings              Physical Exam    Airway  Mallampati: II  TM Distance: 4 - 6 cm  Neck ROM: normal range of motion   Mouth opening: Normal     Cardiovascular  Regular rate and rhythm,  S1 and S2 normal,  no murmur, click, rub, or gallop             Dental      Comments: Missing few lower molars   Pulmonary  Breath sounds clear to auscultation               Abdominal  GI exam deferred       Other Findings            Anesthetic Plan    ASA: 3  Anesthesia type: general          Induction: Intravenous  Anesthetic plan and risks discussed with: Patient      GA/LMA

## 2020-07-30 NOTE — PERIOP NOTES
TRANSFER - OUT REPORT:    Verbal report given to  Chely RN(name) on [facility]  being transferred to (unit) for routine post - op       Report consisted of patients Situation, Background, Assessment and   Recommendations(SBAR). Information from the following report(s) SBAR, Kardex, OR Summary, Procedure Summary, Intake/Output and MAR was reviewed with the receiving nurse. Lines:   Peripheral IV 07/30/20 Left Hand (Active)   Site Assessment Clean, dry, & intact 07/30/20 1236   Phlebitis Assessment 0 07/30/20 1236   Infiltration Assessment 0 07/30/20 1236   Dressing Status Clean, dry, & intact 07/30/20 1236   Dressing Type Transparent;Tape 07/30/20 1236   Hub Color/Line Status Infusing 07/30/20 1236        Opportunity for questions and clarification was provided.       Patient transported with:   Registered Nurse  Tech

## 2020-07-30 NOTE — OP NOTES
[facility]  OPERATIVE REPORT    Name:  Nita Elizabeth  MR#:   [id number]  :  1945  ACCOUNT #:  [de-identified]  DATE OF SERVICE:  2020      PREOPERATIVE DIAGNOSIS:  Recurrent right breast cancer. POSTOPERATIVE DIAGNOSIS:  Recurrent right breast cancer. PROCEDURE PERFORMED:  1. Right mastectomy. 2.  Intradermal areolar four-quadrant injection of Lymphazurin blue dye for lymph node localization. 3.  Right axillary sentinel lymph node biopsy x2 (level 1 and level 2 axillary lymph nodes biopsy). SURGEON:  Hannah Guardado MD    ASSISTANT:  None. ANESTHESIA:  General.    COMPLICATIONS:  None. SPECIMENS REMOVED:  1. Right simple mastectomy. 2.  Right axillary sentinel lymph node #1 (level 2 lymph node). 3.  Right axillary sentinel lymph node #2 (level 1 axillary lymph node). DRAINS/IMPLANTS:  A 19-Nauruan channeled Daniel-Hunter in left axilla/chest wall. ESTIMATED BLOOD LOSS:  Minimal.    PROCEDURE:  The patient is a 75-year-old [place] American female with a history of right breast cancer of the upper inner quadrant for which she underwent partial mastectomy with, according to Dr. Miko Woodard records, a complete axillary lymph node dissection. She subsequently got six weeks of radiation treatment, again, per the patient and per available records. She was diagnosed by a mammographic abnormality in the upper inner quadrant 2 o'clock position, 6 cm from nipple. Sonographically measuring 0.6 x 0.2 x 0.4 cm. MRI showing a lesion in the same area alongside a prior lumpectomy measuring 1.1 x 0.8 x 1.0 cm with the pathology coming back grade III invasive adenocarcinoma.   ER negative, MA negative and HER-2 equivocal.  Because of her previous radiation treatments, breast conservation therapy was not really an option and even though she had a complete axillary lymph node dissection, I did feel that an attempt at sentinel lymph node biopsy was warranted; therefore, I discussed these options with her, alternatives, the nature of surgery, benefits and risks. She gave consent to proceed. On the morning of surgery, she had preoperative nuclear medicine lymph node scintigraphy and from here she was brought to the preoperative holding area. Here, she was met by the operating room team.  Surgical site was marked. She was then brought into the operating room. Preoperative intravenous Ancef 2 g was given. She was positioned on the table with all pressure points appropriately padded. Sequential compression devices were applied. General anesthesia was administered with monitors and oxygen in place. Prior to sterile prep and drape, the area around the right areola had intradermal four-quadrant injection of Lymphazurin blue dye (total 1 mL injected in four discrete places) to complete the lymph node localization. The area was then prepped and draped in usual sterile fashion. After surgical pause, I marked out an elliptical incision to include her previous upper inner quadrant surgical incision and the nipple areolar complex. Incision was then made with 15-blade through the skin and dermis. Skin flaps were then raised in all directions using Bovie cauterization. Cephalad, I went up to just above the clavicle, medially went to the sternum and the medial aspect of the pectoralis muscle, inferiorly went to inframammary fold and for mobilization purpose onto the abdominal wall below this above the fascia and laterally I went into what remained of the axillary fat pad. As mentioned, she had a previous axillary lymph node dissection and that was the expected scarring in this place. Once the margins were defined, NeoProbe was brought into the field and draped sterilely. I was able to identify two axillary sentinel lymph nodes. The first lymph node a level 2 axillary lymph node was identified with both NeoProbe signal and Lymphazurin blue dye.   The sentinel lymph node #2, a level 1 lymph node lateral to the pectoralis, was identified with NeoProbe signal alone. They were dissected out with combination of gentle and blunt Bovie and where discrete lymphatics were present harmonic scalpel dissection. They were sent for intraoperative frozen section analysis. [place] lymph node #1, a level 2 lymph node behind the pectoralis minor muscle had an in vivo count of 189 and ex vivo count of 192. [place] lymph node #2, a level 1 lymph node lateral to the pectoralis minor, had an in vivo count of 19 and ex vivo count of 29. Both lymph nodes came back by intraoperative frozen section analysis as benign without any tumor. Therefore, I proceeded with mastectomy taking the breast off the underlying pectoralis muscle including the pectoralis fascia with the specimen from medial to laterally and cephalad to caudal.  It was removed in its entirety and marked with a long silk suture marking the lateral aspect and short silk suture marking the superior cephalad aspect, it was sent for permanent pathology. Hemostasis was satisfactory with Bovie cauterization of any discrete bleeding points. The remainder of the local anesthesia was injected at the complete field block including the EVERT exit site, a 19-Sinhala channeled Daniel-Hunter drain was brought out through the stab incision and secured to the skin using 3-0 nylon suture in place up into the axilla and then along the inferior aspect of the chest wall. Wound was then thoroughly irrigated. Closure was done in layers with a combination of interrupted 2-0 and 3-0 Vicryl, dermal sutures followed by running 4-0 Monocryl subcuticular suture to close skin. Incision was then cleaned, dried, and dressed with Dermabond. Once the Dermabond was completely dry, a fluff gauze pressure dressing was applied and held in place with a surgical bra. Drain dressing was applied. The patient tolerated the entire procedure without incident.   She was extubated in the OR and taken to recovery room postoperatively in stable condition.       Adelina Blair MD      [id number]  D:  07/30/2020 11:24  T:  07/30/2020 13:25  JOB #:  [id number]  CC:  MD Sven Hsieh MD

## 2020-07-31 VITALS
DIASTOLIC BLOOD PRESSURE: 65 MMHG | WEIGHT: 241.4 LBS | RESPIRATION RATE: 18 BRPM | SYSTOLIC BLOOD PRESSURE: 147 MMHG | TEMPERATURE: 98.9 F | HEART RATE: 80 BPM | OXYGEN SATURATION: 100 % | BODY MASS INDEX: 44.42 KG/M2 | HEIGHT: 62 IN

## 2020-07-31 PROCEDURE — 74011250637 HC RX REV CODE- 250/637: Performed by: SURGERY

## 2020-07-31 PROCEDURE — 74011250636 HC RX REV CODE- 250/636: Performed by: SURGERY

## 2020-07-31 RX ORDER — ONDANSETRON 8 MG/1
8 TABLET, ORALLY DISINTEGRATING ORAL
Qty: 12 TAB | Refills: 0 | Status: SHIPPED | OUTPATIENT
Start: 2020-07-31

## 2020-07-31 RX ORDER — OXYCODONE AND ACETAMINOPHEN 5; 325 MG/1; MG/1
1 TABLET ORAL
Qty: 22 TAB | Refills: 0 | Status: SHIPPED | OUTPATIENT
Start: 2020-07-31 | End: 2020-08-05

## 2020-07-31 RX ADMIN — MULTIPLE VITAMINS W/ MINERALS TAB 1 TABLET: TAB at 08:32

## 2020-07-31 RX ADMIN — SODIUM CHLORIDE, SODIUM LACTATE, POTASSIUM CHLORIDE, AND CALCIUM CHLORIDE 125 ML/HR: 600; 310; 30; 20 INJECTION, SOLUTION INTRAVENOUS at 05:25

## 2020-07-31 RX ADMIN — Medication 1 TABLET: at 08:32

## 2020-07-31 RX ADMIN — HYDROCHLOROTHIAZIDE 25 MG: 12.5 CAPSULE ORAL at 08:32

## 2020-07-31 RX ADMIN — ONDANSETRON 8 MG: 2 INJECTION INTRAMUSCULAR; INTRAVENOUS at 03:19

## 2020-07-31 RX ADMIN — MORPHINE SULFATE 1 MG: 2 INJECTION, SOLUTION INTRAMUSCULAR; INTRAVENOUS at 03:15

## 2020-07-31 RX ADMIN — LEVOTHYROXINE SODIUM 50 MCG: 50 TABLET ORAL at 08:32

## 2020-07-31 RX ADMIN — METFORMIN HYDROCHLORIDE 500 MG: 500 TABLET ORAL at 08:32

## 2020-07-31 RX ADMIN — LISINOPRIL 10 MG: 5 TABLET ORAL at 08:32

## 2020-07-31 NOTE — PROGRESS NOTES
Problem: Falls - Risk of  Goal: *Absence of Falls  Description: Document Angy Green Fall Risk and appropriate interventions in the flowsheet. Outcome: Progressing Towards Goal  Note: Fall Risk Interventions:  Mobility Interventions: Assess mobility with egress test         Medication Interventions: Teach patient to arise slowly, Patient to call before getting OOB    Elimination Interventions: Call light in reach, Patient to call for help with toileting needs              Problem: Patient Education: Go to Patient Education Activity  Goal: Patient/Family Education  Outcome: Progressing Towards Goal     Problem: Diabetes Self-Management  Goal: *Disease process and treatment process  Description: Define diabetes and identify own type of diabetes; list 3 options for treating diabetes. Outcome: Progressing Towards Goal  Goal: *Incorporating nutritional management into lifestyle  Description: Describe effect of type, amount and timing of food on blood glucose; list 3 methods for planning meals. Outcome: Progressing Towards Goal  Goal: *Incorporating physical activity into lifestyle  Description: State effect of exercise on blood glucose levels. Outcome: Progressing Towards Goal  Goal: *Developing strategies to promote health/change behavior  Description: Define the ABC's of diabetes; identify appropriate screenings, schedule and personal plan for screenings. Outcome: Progressing Towards Goal  Goal: *Using medications safely  Description: State effect of diabetes medications on diabetes; name diabetes medication taking, action and side effects. Outcome: Progressing Towards Goal  Goal: *Monitoring blood glucose, interpreting and using results  Description: Identify recommended blood glucose targets  and personal targets.   Outcome: Progressing Towards Goal  Goal: *Prevention, detection, treatment of acute complications  Description: List symptoms of hyper- and hypoglycemia; describe how to treat low blood sugar and actions for lowering  high blood glucose level. Outcome: Progressing Towards Goal  Goal: *Prevention, detection and treatment of chronic complications  Description: Define the natural course of diabetes and describe the relationship of blood glucose levels to long term complications of diabetes.   Outcome: Progressing Towards Goal  Goal: *Developing strategies to address psychosocial issues  Description: Describe feelings about living with diabetes; identify support needed and support network  Outcome: Progressing Towards Goal  Goal: *Insulin pump training  Outcome: Progressing Towards Goal  Goal: *Sick day guidelines  Outcome: Progressing Towards Goal  Goal: *Patient Specific Goal (EDIT GOAL, INSERT TEXT)  Outcome: Progressing Towards Goal     Problem: Patient Education: Go to Patient Education Activity  Goal: Patient/Family Education  Outcome: Progressing Towards Goal     Problem: Pain  Goal: *Control of Pain  Outcome: Progressing Towards Goal  Goal: *PALLIATIVE CARE:  Alleviation of Pain  Outcome: Progressing Towards Goal     Problem: Infection - Risk of, Surgical Site Infection  Goal: *Absence of surgical site infection signs and symptoms  Outcome: Progressing Towards Goal

## 2020-07-31 NOTE — PROGRESS NOTES
Discharge instructions reviewed with the patient. Patient verbalized understanding and verified by teach back. All questions answered. IV discontinued, no redness, swelling or pain noted. Patient awaiting  for transportation home, with an ETA of 30 mins. Patient armband removed and shredded.

## 2020-07-31 NOTE — PROGRESS NOTES
Bedside and verbal report given to JONATHAN Lawson RN (oncoming nurse) by Dany Gutierrez RN  (off going nurse). Report included the following information SBAR, Kardex, OR Summary, Intake/Output, and MAR.    0315  Pt complained of 8/10 pain, given Morphine per [facility]    0319  Pt states that the Morphine made her nauseous, given Zofran per MAR. Bedside and verbal report given by (off going nurse) JONATHAN Lawson RN to (oncoming nurse) Epifanio Gibbs RN. Report included the following information SBAR, Kardex, OR Summary, Intake/Output, and MAR.

## 2020-07-31 NOTE — PROGRESS NOTES
Problem: Falls - Risk of  Goal: *Absence of Falls  Description: Document Colette Dear Fall Risk and appropriate interventions in the flowsheet.   Outcome: Progressing Towards Goal  Note: Fall Risk Interventions:  Mobility Interventions: Assess mobility with egress test         Medication Interventions: Teach patient to arise slowly    Elimination Interventions: Call light in reach

## 2020-07-31 NOTE — PROGRESS NOTES
POD#1  Pt doing well, pain control adequate  AVSS    RRR  CTA  S/NT/+BS  R mastectomy incision c/d/i, no hematoma, EVERT site c/d, serosang in bulb  No calf tenderness    Stable s/p R mastectomy w ASLNB  PO as tolerated  EVERT drain education  D/C to home  F/U with me next week.

## 2020-07-31 NOTE — DISCHARGE INSTRUCTIONS
Patient Education     Mastectomy: What to Expect at Home  Your Recovery     Right after the surgery you will probably feel weak, and you may feel sore for 2 to 3 days. You may have a pulling or stretching sensation near or under your arm. You may also have itching, tingling, and throbbing in the area. This will get better in a few days. You will likely have several drains near your incision. These help with healing. The drains will be removed when the fluid buildup slows. Drains are usually removed in the first few weeks after surgery. You may be able to go back to your normal routine or return to work in several weeks, but it may take longer. How long it takes you to recover will depend on the type of surgery you had. It also depends on whether you had breast reconstruction at the same time, or if you need other treatment. Your doctor or nurse will be able to give you an idea of what you can expect. When you find out that you have cancer, you may feel many emotions and may need some help coping. This is common. Seek out family, friends, and counselors for support. You also can do things at home to make yourself feel better while you go through treatment. Call the [employer] (7-242.954.4606) or visit its website at [employer] for more information. This care sheet gives you a general idea about how long it will take for you to recover. But each person recovers at a different pace. Follow the steps below to get better as quickly as possible. How can you care for yourself at home? Activity  · Rest when you feel tired. Getting enough sleep will help you recover. After any activity, rest and raise your affected arm for a period of time equal to your activity time. · Try to walk each day. Start by walking a little more than you did the day before. Bit by bit, increase the amount you walk. Walking boosts blood flow and helps prevent pneumonia and constipation.   · Avoid strenuous activities, such as biking, jogging, weightlifting, or aerobic exercise, until your doctor says it is okay. This includes housework, especially if you have to use your affected arm. You will probably be able to do your normal activities in 3 to 6 weeks. Avoid repeated motions with your affected arm, such as weed pulling, window cleaning, or vacuuming, for 6 months. · Avoid lifting anything over 10 to 15 pounds for 4 to 6 weeks. This may include a child, grocery bags, a heavy briefcase or backpack, cat litter or dog food bags, or a vacuum . · Ask your doctor when you can drive again. · You will probably be able to go back to work or your normal routine in 3 to 6 weeks. This depends on the type of work you do and any further treatment. · Your doctor will let you know how soon you can take showers or baths. Diet  · You can eat your normal diet. If your stomach is upset, try bland, low-fat foods like plain rice, broiled chicken, toast, and yogurt. · Drink plenty of fluids (unless your doctor tells you not to). · You may notice that your bowels are not regular right after your surgery. This is common. Try to avoid constipation and straining with bowel movements. Take a fiber supplement such as Citrucel or Metamucil every day. If you have not had a bowel movement after a couple of days, take a mild laxative like Milk of Magnesia or a stool softener like Colace. Medicines  · Your doctor will tell you if and when you can restart your medicines. He or she will also give you instructions about taking any new medicines. · If you take aspirin or some other blood thinner, ask your doctor if and when to start taking it again. Make sure that you understand exactly what your doctor wants you to do. · Take pain medicines exactly as directed. ? If the doctor gave you a prescription medicine for pain, take it as prescribed.   ? If you are not taking a prescription pain medicine, ask your doctor if you can take an over-the-counter medicine. · If your doctor prescribed antibiotics, take them as directed. Do not stop taking them just because you feel better. You need to take the full course of antibiotics. · If you think your pain medicine is making you sick to your stomach:  ? Take your medicine after meals (unless your doctor has told you not to). ? Ask your doctor for a different pain medicine. Incision care  · You will have a dressing over the cut (incision). A dressing helps the incision heal and protects it. Your doctor will tell you how to take care of this. · A woman may wear a special bra (surgi-bra) that holds the dressing in place after the surgery. The doctor will say when the bra is no longer needed. Drain care  · You may have one or more drains near your incision. Your doctor will tell you how to take care of them. Arm exercises  · If you had any lymph nodes removed from under your arm, your doctor will advise you to do arm exercises. Do not do the exercises until your doctor says it is okay. Ice and elevation  · Do not use ice for swelling or pain. · Prop up your arm on a pillow when you sit or lie down. Try to keep your arm above the level of your heart. This will help reduce swelling. Follow-up care is a key part of your treatment and safety. Be sure to make and go to all appointments, and call your doctor if you are having problems. It's also a good idea to know your test results and keep a list of the medicines you take. When should you call for help? [id number] anytime you think you may need emergency care. For example, call if:  · You passed out (lost consciousness). · You have chest pain, are short of breath, or cough up blood. Call your doctor now or seek immediate medical care if:  · You are sick to your stomach or cannot drink fluids. · You cannot pass stools or gas. · You have pain that does not get better after you take your pain medicine.   · You have loose stitches, or your incision comes open.  · Bright red blood has soaked through the bandage over your incision. · You have signs of a blood clot in your leg (called a deep vein thrombosis), such as:  ? Pain in your calf, back of the knee, thigh, or groin. ? Redness or swelling in your leg. · You have signs of infection, such as:  ? Increased pain, swelling, warmth, or redness. ? Red streaks leading from the incision. ? Pus draining from the incision. ? A fever. Watch closely for changes in your health, and be sure to contact your doctor if:  · You have any problems. · You have new or worse swelling or pain in your arm. Where can you learn more? Go to http://www.gray.com/  Enter S340 in the search box to learn more about \"Mastectomy: What to Expect at Home. \"  Current as of: August 22, 2019               Content Version: 12.5  © 5083-7042 [employer]. Care instructions adapted under license by [employer] (which disclaims liability or warranty for this information). If you have questions about a medical condition or this instruction, always ask your healthcare professional. [address] any warranty or liability for your use of this information.

## 2020-07-31 NOTE — PROGRESS NOTES
Bedside and Verbal shift change report given to  Hiral Ferrer (oncoming nurse) by Dorothy Martin RN (offgoing nurse). Report included the following information SBAR, Kardex and MAR. SHIFT UPDATES:  Patient presented to unit from PACU after having mastectomy of the right breast mid-day. Patient ambulated to bathroom without difficulty with one stand by assistance for safety. ABNORMAL LAB:   Results for Wilman Awan (MRN [id number]) as of 7/30/2020 20:09   Ref. Range 7/30/2020 07:36   Hemoglobin A1c, (calculated) Latest Ref Range: 4.2 - 5.6 % 6.9 (H)   Est. average glucose Latest Units: mg/dL 151     Wounds? Right breast (Dry dressing placed by surgical team). Central Lines? None. Last BM:  07/30/2020 (Prior to admission)       Pending Labs for AM Draw: None. Discharge plan:  As of 07/30/2020 case management note, Patient will discharge home in the next 24-48 hours home with no transition needs.      Jeff Baker  7/30/2020  8:12 PM

## 2020-07-31 NOTE — PROGRESS NOTES
8934  Bedside and verbal shift change report given to Aby Pisano, RN (on coming nurse) by D. Salomon Boas, RN (off going nurse). Report included the following information SBAR, Kardex, OR Summary, Intake/Output and MAR.    1111  JOSUE Martin RN-  AVS review with pt, opportunity for questions and concerns at this time. D/c IV clean and dry. Properly discarded armbands.

## 2022-03-19 PROBLEM — C50.911 RECURRENT BREAST CANCER, RIGHT: Status: ACTIVE | Noted: 2020-07-30

## 2022-03-19 PROBLEM — Z90.11 S/P RIGHT MASTECTOMY: Status: ACTIVE | Noted: 2020-07-30

## 2022-10-14 RX ORDER — EPINEPHRINE 0.1 MG/ML
1 INJECTION INTRACARDIAC; INTRAVENOUS
Status: CANCELLED | OUTPATIENT
Start: 2022-10-14 | End: 2022-10-15

## 2022-10-14 RX ORDER — SODIUM CHLORIDE 0.9 % (FLUSH) 0.9 %
5-40 SYRINGE (ML) INJECTION AS NEEDED
Status: CANCELLED | OUTPATIENT
Start: 2022-10-14

## 2022-10-14 RX ORDER — DIPHENHYDRAMINE HYDROCHLORIDE 50 MG/ML
50 INJECTION, SOLUTION INTRAMUSCULAR; INTRAVENOUS ONCE
Status: CANCELLED | OUTPATIENT
Start: 2022-10-14 | End: 2022-10-14

## 2022-10-14 RX ORDER — SODIUM CHLORIDE 0.9 % (FLUSH) 0.9 %
5-40 SYRINGE (ML) INJECTION EVERY 8 HOURS
Status: CANCELLED | OUTPATIENT
Start: 2022-10-14

## 2022-10-14 RX ORDER — ATROPINE SULFATE 0.1 MG/ML
0.5 INJECTION INTRAVENOUS
Status: CANCELLED | OUTPATIENT
Start: 2022-10-14 | End: 2022-10-15

## 2022-10-14 RX ORDER — DEXTROMETHORPHAN/PSEUDOEPHED 2.5-7.5/.8
1.2 DROPS ORAL
Status: CANCELLED | OUTPATIENT
Start: 2022-10-14

## 2022-10-17 ENCOUNTER — HOSPITAL ENCOUNTER (OUTPATIENT)
Age: 77
Setting detail: OUTPATIENT SURGERY
Discharge: HOME | End: 2022-10-17
Attending: INTERNAL MEDICINE | Admitting: INTERNAL MEDICINE
Payer: MEDICARE

## 2022-10-17 VITALS
SYSTOLIC BLOOD PRESSURE: 178 MMHG | BODY MASS INDEX: 42.31 KG/M2 | HEIGHT: 62 IN | DIASTOLIC BLOOD PRESSURE: 65 MMHG | OXYGEN SATURATION: 97 % | HEART RATE: 68 BPM | RESPIRATION RATE: 16 BRPM | WEIGHT: 229.9 LBS | TEMPERATURE: 97.8 F

## 2022-10-17 LAB — GLUCOSE BLD STRIP.AUTO-MCNC: 161 MG/DL (ref 70–110)

## 2022-10-17 PROCEDURE — 76040000007: Performed by: INTERNAL MEDICINE

## 2022-10-17 PROCEDURE — 77030021593 HC FCPS BIOP ENDOSC BSC -A: Performed by: INTERNAL MEDICINE

## 2022-10-17 PROCEDURE — 99153 MOD SED SAME PHYS/QHP EA: CPT | Performed by: INTERNAL MEDICINE

## 2022-10-17 PROCEDURE — 88305 TISSUE EXAM BY PATHOLOGIST: CPT

## 2022-10-17 PROCEDURE — 77030040361 HC SLV COMPR DVT MDII -B: Performed by: INTERNAL MEDICINE

## 2022-10-17 PROCEDURE — 74011250636 HC RX REV CODE- 250/636: Performed by: INTERNAL MEDICINE

## 2022-10-17 PROCEDURE — G0500 MOD SEDAT ENDO SERVICE >5YRS: HCPCS | Performed by: INTERNAL MEDICINE

## 2022-10-17 PROCEDURE — 82962 GLUCOSE BLOOD TEST: CPT

## 2022-10-17 PROCEDURE — 2709999900 HC NON-CHARGEABLE SUPPLY: Performed by: INTERNAL MEDICINE

## 2022-10-17 RX ORDER — LOSARTAN POTASSIUM 100 MG/1
100 TABLET ORAL DAILY
COMMUNITY
Start: 2022-09-08

## 2022-10-17 RX ORDER — LATANOPROSTENE BUNOD 0.24 MG/ML
1 SOLUTION/ DROPS OPHTHALMIC DAILY
COMMUNITY
Start: 2022-10-14

## 2022-10-17 RX ORDER — FLUMAZENIL 0.1 MG/ML
0.2 INJECTION INTRAVENOUS
Status: DISCONTINUED | OUTPATIENT
Start: 2022-10-17 | End: 2022-10-17 | Stop reason: HOSPADM

## 2022-10-17 RX ORDER — MIDAZOLAM HYDROCHLORIDE 1 MG/ML
.25-5 INJECTION, SOLUTION INTRAMUSCULAR; INTRAVENOUS
Status: DISCONTINUED | OUTPATIENT
Start: 2022-10-17 | End: 2022-10-17 | Stop reason: HOSPADM

## 2022-10-17 RX ORDER — NALOXONE HYDROCHLORIDE 0.4 MG/ML
0.4 INJECTION, SOLUTION INTRAMUSCULAR; INTRAVENOUS; SUBCUTANEOUS
Status: DISCONTINUED | OUTPATIENT
Start: 2022-10-17 | End: 2022-10-17 | Stop reason: HOSPADM

## 2022-10-17 RX ORDER — FENTANYL CITRATE 50 UG/ML
100 INJECTION, SOLUTION INTRAMUSCULAR; INTRAVENOUS
Status: DISCONTINUED | OUTPATIENT
Start: 2022-10-17 | End: 2022-10-17 | Stop reason: HOSPADM

## 2022-10-17 RX ORDER — MECLIZINE HYDROCHLORIDE 25 MG/1
25 TABLET ORAL AS NEEDED
COMMUNITY
Start: 2022-09-08

## 2022-10-17 RX ORDER — SODIUM CHLORIDE 9 MG/ML
1000 INJECTION, SOLUTION INTRAVENOUS CONTINUOUS
Status: DISCONTINUED | OUTPATIENT
Start: 2022-10-17 | End: 2022-10-17 | Stop reason: HOSPADM

## 2022-10-17 RX ADMIN — SODIUM CHLORIDE 1000 ML: 9 INJECTION, SOLUTION INTRAVENOUS at 11:41

## 2022-10-17 NOTE — PROCEDURES
(EGD) Esophagogastroduodenoscopy (UPPER ENDOSCOPY) Procedure Note  [facility] [place]  __________________________________________________________________________________________________________________________      10/17/2022     Patient: Rafi Wu YOB: 1945 Gender: female Age: 76 y.o. INDICATION:  For the past year she has been having frequent belching 30 minutes after eating lasting for an hour. she is able to finish her meal. she avoids eating in the morning. no nausea or vomiting. has epigastric fullness. No heartburns, dysphagia or abdominal pain. Not taking any PPI. she tried HyperWeek and Partigi but non worked. no hx PUD. EGD many years ago by Dr Tanisha Liraino. it was negative. she denied taking NSAID's or ASA. No smoking or drinking. Hgb in 5/ 23/ 2022 was normal at 13 but MCV and MCH were low. H Pylori in the stool was negative and do an EGD. She may have simply mild diabetic gastroparesis? ? Hgb A1c 6.8  She used to take PPI for 2 years for chronic GERD but stopped 3 years ago. Now she has occasional heartburns 4 to 5 times in a year. No dysphasia, N/Vx. Weight stable. Now she has one to 2 bm soft to loose bm/ day    : Aida Broderick MD    Referring Provider:  Tristan, MD Albert    Sedation:  Versed 5 mg IV, Fentanyl 100 mcg IV  Procedure Details:  After infomed consent was obtained for the procedure, with all risks and benefits of procedure explained to the patient. She was taken to the endoscopy suite and placed in the left lateral decubitus position. Following sequential administration of sedation as per above, the endoscope was inserted into the mouth and advanced under direct vision to third portion of the duodenum. A careful inspection was made as the gastroscope was withdrawn, including a retroflexed view of the proximal stomach; findings and interventions are described below.       OROPHARYNX: The vocal Cords and the larynx are normal.   ESOPHAGUS: The proximal, mid, and distal oesophagus are normal. The Z-Line is intact. > 1 cm Hiatal hernia. Diaphragmatic opening or notch is located at 40 cm. STOMACH: No evidence of blood, fluid or solid food retention. a larger benign looking fold with congested mucosa. Located at the junction of the greater curvature and the fundus. 4 superficial biopsies taken. 5 mm superficial but raised ulceration on the posterior wall of the antrum. 5 antral biopsies are taken. The cardia, body, lesser curvature, greater curvature and pylorus are normal. The gastric mucosa is normal.  DUODENUM: The bulb, second, third portions and area of the major papilla are normal.  PROXIMAL JEJUNUM:  Not examined. Therapies:  Nil    Specimen: none           Complications:   None    EBL:  Negligible. IMPLANTS: * No implants in log *  IMPRESSION: > 1 cm Hiatal hernia. Diaphragmatic opening or notch is located at 40 cm. The fundus on antegrade and retroflex views showed a larger benign looking fold with congested mucosa. Located at the junction of the greater curvature and the fundus. 4 superficial biopsies taken. There are multiple long vertical antral linear erosions. 5 mm superficial but raised ulceration on the posterior wall of the antrum. 5 antral biopsies are taken. RECOMMENDATION:  May resume antireflux diet. Avoid all NSAID's. Make a FU appointment at the office. Start taking Omeprazole 20 mg daily x 2 months and  then as needed.      Assistant: None    --Alen Myrick MD on 10/17/2022 at 1:03 PM

## 2022-10-17 NOTE — H&P
Assessment/Plan  # Detail Type Description    1. Assessment Diarrhea (R19.7). Impression polyps removed by Dr Irena Girard 5 to 10 years ago. Last colonoscopy by my self on 2020. with difficulty. decreased anal sphincter tone, slightly tortuous loopy sigmoid but mostly loopy and fixed colon from adhesions and 4 mm cecal polyp. She used to have one bm twice a day and sometimes every 2 days. She has been on Metformin x 20 years for DM the same dose. weight has been stable. but in the last 6 months there has been a change in bowel habits where initially she was having 2 to 3 liquid bm PC / day but now 2 to 3 loose to soft. has fecal incontinence twice a week in the evening. It makes her afraid to go out. no blood or abdominal pain. no mucous. before used to be regular. No stools at night. she denied having rectal bleeding abdominal pain. She has no fx hx of colon cancer. but her mother  of abdominal cancer at age 27 yo. 2 maternal aunt  of cancer not sure what kind. She has recurring  right side breast cancer the first one was 20 years ago treated w surgery and radiation. PMH: DM 15 years on oral medication, HTn but no CKD, CAD or CVA. She used to have a lot of heartburns used to take Nexium for few years but stopped it 4 years ago and doing well with occasional heartburns treated simply with alkalizer. no N/V or dysphagia. she gained weight. PSH: right lumpectomy 22 years ago for breast cancer. Hysterectomy, bilateral knee replacement, carpal tunnel surgery. Left shoulder surgery x 2. Denied smoking or abusing alcohol  she is morbidly obese BMI >40. Patient Plan No flatulence   I think her problems caused mostly by IBS diarrhea and  the low anal sphincter tone. first we need to r/o certain problems by  doing some stool testing  She had hysterectomy but no cholecystectomy.  BMI 41.7  No CAD or CVA  Told her about the Kegel exercise and their is no harm from trying the Metamucil to introduce gradually. No need to  repeat colonoscopy. Plan Orders Fecal Fat/Muscle Fibers, Qual to be performed., Giardia, EIA; Ova/Parasite to be performed., H. pylori Stool Ag, EIA to be performed. , Pancreatic Elastase, Fecal to be performed. and White Blood Cells (WBC), Stool to be performed. 2. Assessment Eructation (R14.2). Patient Plan for the past year she has been having frequent belching 30 minutes after eating lasting for an hour. she is able to finish her meal. she avoids eating in the morning. no nausea or vomiting. has epigastric fullness. No heartburns, dysphagia or abdominal pain. Not taking any PPI. she tried [employer] and Nordex [employer] but non worked. no hx PUD. EGD many years ago by Dr Jaclyn Orozco. it was negative. she denied taking NSAID's or ASA. No smoking or drinking. Hgb in 5/ 23/ 2022 was normal at 13 but MCV and MCH were low. therefore we should check the H Pylori and do an EGD. She may have simply mild diabetic gastroparesis? ?    I explained to her the procedure of upper endoscopy or EGD, the alternative and the risks involved which include but not limited to aspiration, bleeding perforation or reaction to sedation. She was agreeable to this. This 76year old  patient was referred by Katerin Joel. This 76year old female presents for Diarrhea and Belching. History of Present Illness  1. Diarrhea   Onset: 4 months ago. Severity level is: moderate-severe. Stool frequency: constantly. The patient describes it as loose, watery, brown. It occurs weekly. The problem is improving. Symptom is aggravated by food. She denies relieving factors. Associated symptoms include fecal incontinence. Pertinent negatives include abdominal pain, bloating, change in appetite, fever, flatulence, joint pain, nausea, rash, vomiting and weight loss. Additional information: no family history of Crohns/Colitis and last colonoscopy  7/24/2020  . Bm x 3 daily.         2.  Belching   The onset was 1 year ago. The problem is aggravated by food, water. Denies relieving factors. Pt never had an EGD          Problem List  Problem Description Onset Date Chronic Clinical Status Notes   Type II diabetes mellitus w/o complication 91/76/4168 Y     Benign essential hypertension 09/20/2012 Y     Mixed hyperlipidemia 09/20/2012 Y     Hypothyroidism 09/20/2012 Y     Gastroesophageal reflux disease 09/20/2012 Y     Gastroesophageal reflux in child 07/01/2020 N     H/O lower GIT neoplasm 07/01/2020 N     Infiltrating duct carcinoma of right female breast 07/01/2020 N     Mild nonproliferative retinopathy due to type 2 diabetes mellitus 07/01/2020 N     Morbid obesity with BMI of 40.0-44.9, adult  N         Medications (active prior to today)  Medication Instructions Start Date Stop Date Refilled Elsewhere   CPAP  INHALATION GAS 5-15 pressures AHP 11/17/2016   N   FreeStyle Lancets 28 gauge use daily to check sugar as directed 12/13/2017   N   FreeStyle Lite Strips use daily to check sugar as directed 08/20/2020 08/20/2020 N   brimonidine 0.15 % eye drops  05/19/2021   Y   dorzolamide 22.3 mg-timolol 6.8 mg/mL eye drops  01/14/2021   Y   Lumigan 0.01 % eye drops  05/17/2021   Y   meclizine 25 mg tablet take 1/2 -1 tablet by oral route  every 6 hours as needed for vertigo 08/31/2021 08/31/2021 N   amlodipine 5 mg tablet take 1 tablet by oral route  every day 11/08/2021 11/08/2021 N   Calcium 600 + D(3) 600 mg (1,500 mg)-400 unit tablet Take 1 tablet by oral route daily 11/08/2021 11/08/2021 N   atorvastatin 10 mg tablet take 1 tablet by oral route  every day 11/08/2021 11/08/2021 N   levothyroxine 50 mcg tablet take 1 tablet by oral route  every day 03/17/2022 03/17/2022 N   losartan 100 mg tablet take 1 tablet by oral route  every day 03/25/2022   N   hydrochlorothiazide 25 mg tablet take 1 tablet by oral route  every day 03/25/2022   N   Vyzulta 0.024 % eye drops  06/05/2022   Y   metformin 500 mg tablet take 1 tablet by oral route 2 times every day with morning and evening meals 07/27/2022 07/27/2022 N       Medication Reconciliation  Medications reconciled today.     Medication Reviewed  Adherence Medication Name Sig Desc Elsewhere Status   taking as directed Lumigan 0.01 % eye drops  Y Verified   taking as directed amlodipine 5 mg tablet take 1 tablet by oral route  every day N Verified   taking as directed dorzolamide 22.3 mg-timolol 6.8 mg/mL eye drops  Y Verified   taking as directed Vyzulta 0.024 % eye drops  Y Verified   taking as directed losartan 100 mg tablet take 1 tablet by oral route  every day N Verified   taking as directed FreeStyle Lite Strips use daily to check sugar as directed N Verified   taking as directed levothyroxine 50 mcg tablet take 1 tablet by oral route  every day N Verified   taking as directed FreeStyle Lancets 28 gauge use daily to check sugar as directed N Verified   taking as directed Calcium 600 + D(3) 600 mg (1,500 mg)-400 unit tablet Take 1 tablet by oral route daily N Verified   taking as directed CPAP  INHALATION GAS 5-15 pressures AHP N Verified   taking as directed hydrochlorothiazide 25 mg tablet take 1 tablet by oral route  every day N Verified   taking as directed brimonidine 0.15 % eye drops  Y Verified   taking as directed atorvastatin 10 mg tablet take 1 tablet by oral route  every day N Verified   taking as directed meclizine 25 mg tablet take 1/2 -1 tablet by oral route  every 6 hours as needed for vertigo N Verified   taking as directed metformin 500 mg tablet take 1 tablet by oral route 2 times every day with morning and evening meals N Verified     Medications (Added, Continued or Stopped today)  Start Date Medication Directions PRN Status PRN Reason Instruction Stop Date   11/08/2021 amlodipine 5 mg tablet take 1 tablet by oral route  every day N      11/08/2021 atorvastatin 10 mg tablet take 1 tablet by oral route  every day N      05/19/2021 brimonidine 0.15 % eye drops  N 11/08/2021 Calcium 600 + D(3) 600 mg (1,500 mg)-400 unit tablet Take 1 tablet by oral route daily N      11/17/2016 CPAP  INHALATION GAS 5-15 pressures AHP N      01/14/2021 dorzolamide 22.3 mg-timolol 6.8 mg/mL eye drops  N      12/13/2017 FreeStyle Lancets 28 gauge use daily to check sugar as directed N      08/20/2020 FreeStyle Lite Strips use daily to check sugar as directed N      03/25/2022 hydrochlorothiazide 25 mg tablet take 1 tablet by oral route  every day N      03/17/2022 levothyroxine 50 mcg tablet take 1 tablet by oral route  every day N      03/25/2022 losartan 100 mg tablet take 1 tablet by oral route  every day N      05/17/2021 Lumigan 0.01 % eye drops  N      08/31/2021 meclizine 25 mg tablet take 1/2 -1 tablet by oral route  every 6 hours as needed for vertigo N      07/27/2022 metformin 500 mg tablet take 1 tablet by oral route 2 times every day with morning and evening meals N      06/05/2022 Vyzulta 0.024 % eye drops  N        Allergies  Ingredient Reaction (Severity) Medication Name Comment   NO KNOWN ALLERGIES        Reviewed, no changes. Review of Systems  System Neg/Pos Details   Constitutional Negative Chills, Fever, Malaise and Weight loss. ENMT Negative Ear infections, Nasal congestion, Sinus Infection and Sore throat. Eyes Negative Double vision and Eye pain. Respiratory Negative Asthma, Chronic cough, Dyspnea, Pleuritic pain and Wheezing. Cardio Negative Chest pain, Edema and Irregular heartbeat/palpitations. GI Positive Fecal incontinence. GI Negative Abdominal pain, Bloating, Change in appetite, Change in bowel habits, Constipation, Decreased appetite, Diarrhea, Dysphagia, Flatulence, Heartburn, Hematemesis, Hematochezia, Melena, Nausea, Reflux and Vomiting.  Negative Dysuria, Hematuria, Urinary frequency, Urinary incontinence and Urinary retention.    Endocrine Negative Cold intolerance, Heat intolerance and Increased thirst.   Neuro Negative Dizziness, Headache, Numbness, Tremors and Vertigo. Psych Negative Anxiety, Depression and Increased stress. Integumentary Negative Hives, Pruritus and Rash. MS Negative Back pain, Joint pain and Myalgia. Hema/Lymph Negative Easy bleeding, Easy bruising and Lymphadenopathy. Reproductive Negative Breast lumps, Breast pain and Vaginal discharge. Vital Signs   Gynecologic History  Patient is postmenopausal.      Height  Time ft in cm Last Measured Height Position   1:48 PM 5.0 2.50 158.75 08/11/2022 Standing     Weight/BSA/BMI  Time lb oz kg Context BMI kg/m2 BSA m2   1:48 .00  105.233 dressed with shoes 41.76      Arterial Line 1 BP (mmHg) BP Patient Position Resp SpO2 O2 Device O2 Flow Rate (L/min) Level of Consciousness MEWS Score Weight       10/17/22 1119 98.1 °F (36.7 °C) 78 147/62 Abnormal  -- -- 18 99 % None (Room air) -- 0 1 104.3 kg (229 lb 14.4 oz     Physical  Exam  Exam Findings Details   Constitutional Normal No acute distress. Well Nourished. Well developed. Eyes Normal General - Right: Normal, Left: Normal. Conjunctiva - Right: Normal, Left: Normal. Sclera - Right: Normal, Left: Normal. Cornea - Right: Normal, Left: Normal. Pupil - Right: Normal, Left: Normal.   Nose/Mouth/Throat Normal Lips/teeth/gums - Normal. Tongue - Normal. Buccal mucosa - Normal. Palate & uvula - Normal.   Neck Exam Normal Inspection - Normal. Palpation - Normal. Thyroid gland - Normal. Cervical lymph nodes - Normal.   Respiratory Normal Inspection - Normal. Auscultation - Normal. Percussion - Normal. Cough - Absent. Effort - Normal.   Cardiovascular Normal Heart rate - Regular rate. Heart sounds - Normal S1, Normal S2. Murmurs - None. Extremities - No edema. Abdomen * Obese. Inspection - protuberant. Abdomen Normal Appliance(s) - None. Abdominal muscles - Normal. Auscultation - Normal. Percussion - Normal. Anterior palpation - Normal, No guarding, No rebound. CVA tenderness - None.  Umbilicus - Normal. Abdominal reflexes - Normal. No abdominal tenderness. No hepatic enlargement. No spleen enlargement. No hernia. No ascites. No palpable mass. Kahn's sign - Normal.   Skin Normal Inspection - Normal.   Musculoskeletal Normal Hands - Left: Normal, Right: Normal.   Extremity Normal No cyanosis. No edema. Clubbing - Absent. Neurological Normal Level of consciousness - Normal. Orientation - Normal. Memory - Normal. Motor - Normal. Balance & gait - Normal. Coordination - Normal. Fine motor skills - Normal. DTRs - Normal.   Psychiatric Comments sharp. pleasant   Psychiatric Normal Orientation - Oriented to time, place, person & situation. Not anxious. Appropriate mood and affect. Behavior is appropriate for age. Sufficient language. No memory loss. Immunizations Entered by History  Date Immunization   11/1/2016 12:00:00 AM Flu (3 yrs or older)   10/1/2008 12:00:00 AM pneumococcal polysaccharide vaccine, 23 valent   7/1/2005 12:00:00 AM Pneumo (2 yrs or older) (PPV23)       Active Patient Care Team Members  Name Contact Agency Type Support Role Relationship Active Date Inactive Date Specialty    [facility]   primary practice provider    [address] [place] [address]   encounter provider    Pulmonary Medicine   Daniel Prakash   specialist    Cardiology   Massimo Rios   encounter provider    Mark Hanks   specialist    Gastroenterology   Bert Broderick   specialist    Ophthalmology   Alejandro Soliz   Patient provider PCP   Family Practice   Blossom Schuster   encounter provider    Gastroenterology   Maite Reid    Spouse        Patient is questioned and examined.

## 2022-10-17 NOTE — DISCHARGE INSTRUCTIONS
Deon Ibarra  [id number]  1945    EGD DISCHARGE INSTRUCTIONS  Discomfort:  Sore throat- throat lozenges or warm salt water gargle  redness at IV site- apply warm compress to area; if redness or soreness persist- contact your physician  Gaseous discomfort- walking, belching will help relieve any discomfort  You may not operate a vehicle until the next day  You may not engage in an occupation involving machinery or appliances until the next day  You may not drink alcoholic beverages until the next day  Avoid making any critical decisions for at least 24 hour    DIET   You may not resume your regular diet. Antireflux diet. ACTIVITY  You may not resume your normal daily activities   Spend the remainder of the day resting -  avoid any strenuous activity. CALL M.D. ANY SIGN OF   Increasing pain, nausea, vomiting  Abdominal distension (swelling)  New increased bleeding (oral or rectal)  Fever (chills)  Pain in chest area  Bloody discharge from nose or mouth  Shortness of breath     You may not take any Advil, Aspirin, Ibuprofen, Motrin, Aleve, or Goodys  ONLY  Tylenol as needed for pain. Follow-up Instructions: Follow-up in the office as scheduled or make a follow-up appointment in 2 weeks. Marta Paiz MD  October 17, 2022    DISCHARGE SUMMARY from Nurse    PATIENT INSTRUCTIONS:    After general anesthesia or intravenous sedation, for 24 hours or while taking prescription Narcotics:  Limit your activities  Do not drive and operate hazardous machinery  Do not make important personal or business decisions  Do  not drink alcoholic beverages  If you have not urinated within 8 hours after discharge, please contact your surgeon on call.     Report the following to your surgeon:  Excessive pain, swelling, redness or odor of or around the surgical area  Temperature over 100.5  Nausea and vomiting lasting longer than 4 hours or if unable to take medications  Any signs of decreased circulation or nerve impairment to extremity: change in color, persistent  numbness, tingling, coldness or increase pain  Any questions    What to do at Home:  Recommended activity: as above,     If you experience any of the following symptoms as above, please follow up with Dr. Kevin Hunt. *  Please give a list of your current medications to your Primary Care Provider. *  Please update this list whenever your medications are discontinued, doses are      changed, or new medications (including over-the-counter products) are added. *  Please carry medication information at all times in case of emergency situations. These are general instructions for a healthy lifestyle:    No smoking/ No tobacco products/ Avoid exposure to second hand smoke  Surgeon General's Warning:  Quitting smoking now greatly reduces serious risk to your health. Obesity, smoking, and sedentary lifestyle greatly increases your risk for illness    A healthy diet, regular physical exercise & weight monitoring are important for maintaining a healthy lifestyle    You may be retaining fluid if you have a history of heart failure or if you experience any of the following symptoms:  Weight gain of 3 pounds or more overnight or 5 pounds in a week, increased swelling in our hands or feet or shortness of breath while lying flat in bed. Please call your doctor as soon as you notice any of these symptoms; do not wait until your next office visit. The discharge information has been reviewed with the patient and spouse. The patient and spouse verbalized understanding. Discharge medications reviewed with the patient and spouse and appropriate educational materials and side effects teaching were provided.     Patient armband removed and shredded    ___________________________________________________________________________________________________________________________________

## 2025-03-21 ENCOUNTER — HOSPITAL ENCOUNTER (OUTPATIENT)
Facility: HOSPITAL | Age: 80
Discharge: HOME | End: 2025-03-24
Payer: MEDICARE

## 2025-03-21 DIAGNOSIS — Z01.818 PREOP TESTING: ICD-10-CM

## 2025-03-21 LAB
EKG ATRIAL RATE: 73 BPM
EKG DIAGNOSIS: NORMAL
EKG P AXIS: 51 DEGREES
EKG P-R INTERVAL: 168 MS
EKG Q-T INTERVAL: 394 MS
EKG QRS DURATION: 96 MS
EKG QTC CALCULATION (BAZETT): 434 MS
EKG R AXIS: 67 DEGREES
EKG T AXIS: 13 DEGREES
EKG VENTRICULAR RATE: 73 BPM
EST. AVERAGE GLUCOSE BLD GHB EST-MCNC: 126 MG/DL
HBA1C MFR BLD: 6 % (ref 4.2–5.6)

## 2025-03-21 PROCEDURE — 93005 ELECTROCARDIOGRAM TRACING: CPT | Performed by: SURGERY

## 2025-03-21 PROCEDURE — 83036 HEMOGLOBIN GLYCOSYLATED A1C: CPT

## 2025-03-24 ENCOUNTER — ANESTHESIA EVENT (OUTPATIENT)
Facility: HOSPITAL | Age: 80
End: 2025-03-24
Payer: MEDICARE

## 2025-03-24 LAB
EKG ATRIAL RATE: 73 BPM
EKG DIAGNOSIS: NORMAL
EKG P AXIS: 51 DEGREES
EKG P-R INTERVAL: 168 MS
EKG Q-T INTERVAL: 394 MS
EKG QRS DURATION: 96 MS
EKG QTC CALCULATION (BAZETT): 434 MS
EKG R AXIS: 67 DEGREES
EKG T AXIS: 13 DEGREES
EKG VENTRICULAR RATE: 73 BPM

## 2025-03-24 PROCEDURE — 93010 ELECTROCARDIOGRAM REPORT: CPT | Performed by: INTERNAL MEDICINE

## 2025-03-27 ENCOUNTER — ANESTHESIA (OUTPATIENT)
Facility: HOSPITAL | Age: 80
End: 2025-03-27
Payer: MEDICARE

## 2025-03-27 ENCOUNTER — HOSPITAL ENCOUNTER (OUTPATIENT)
Facility: HOSPITAL | Age: 80
Setting detail: OUTPATIENT SURGERY
Discharge: HOME | End: 2025-03-27
Attending: SURGERY | Admitting: SURGERY
Payer: MEDICARE

## 2025-03-27 VITALS
RESPIRATION RATE: 16 BRPM | TEMPERATURE: 98 F | HEART RATE: 78 BPM | HEIGHT: 63 IN | SYSTOLIC BLOOD PRESSURE: 134 MMHG | BODY MASS INDEX: 39.6 KG/M2 | WEIGHT: 223.5 LBS | DIASTOLIC BLOOD PRESSURE: 88 MMHG | OXYGEN SATURATION: 96 %

## 2025-03-27 DIAGNOSIS — L98.9 SKIN LESION OF CHEST WALL: Primary | ICD-10-CM

## 2025-03-27 LAB — GLUCOSE BLD STRIP.AUTO-MCNC: 109 MG/DL (ref 70–110)

## 2025-03-27 PROCEDURE — 88305 TISSUE EXAM BY PATHOLOGIST: CPT

## 2025-03-27 PROCEDURE — 3600000002 HC SURGERY LEVEL 2 BASE: Performed by: SURGERY

## 2025-03-27 PROCEDURE — 6360000002 HC RX W HCPCS: Performed by: SURGERY

## 2025-03-27 PROCEDURE — 7100000010 HC PHASE II RECOVERY - FIRST 15 MIN: Performed by: SURGERY

## 2025-03-27 PROCEDURE — 2709999900 HC NON-CHARGEABLE SUPPLY: Performed by: SURGERY

## 2025-03-27 PROCEDURE — 2580000003 HC RX 258: Performed by: SURGERY

## 2025-03-27 PROCEDURE — 3600000012 HC SURGERY LEVEL 2 ADDTL 15MIN: Performed by: SURGERY

## 2025-03-27 PROCEDURE — 7100000011 HC PHASE II RECOVERY - ADDTL 15 MIN: Performed by: SURGERY

## 2025-03-27 PROCEDURE — 6360000002 HC RX W HCPCS

## 2025-03-27 PROCEDURE — 3700000001 HC ADD 15 MINUTES (ANESTHESIA): Performed by: SURGERY

## 2025-03-27 PROCEDURE — 82962 GLUCOSE BLOOD TEST: CPT

## 2025-03-27 PROCEDURE — 3700000000 HC ANESTHESIA ATTENDED CARE: Performed by: SURGERY

## 2025-03-27 RX ORDER — PROPOFOL 10 MG/ML
INJECTION, EMULSION INTRAVENOUS
Status: DISCONTINUED | OUTPATIENT
Start: 2025-03-27 | End: 2025-03-27 | Stop reason: SDUPTHER

## 2025-03-27 RX ORDER — ONDANSETRON 2 MG/ML
4 INJECTION INTRAMUSCULAR; INTRAVENOUS
Status: CANCELLED | OUTPATIENT
Start: 2025-03-27

## 2025-03-27 RX ORDER — HYDROMORPHONE HYDROCHLORIDE 1 MG/ML
0.5 INJECTION, SOLUTION INTRAMUSCULAR; INTRAVENOUS; SUBCUTANEOUS EVERY 5 MIN PRN
Refills: 0 | Status: CANCELLED | OUTPATIENT
Start: 2025-03-27

## 2025-03-27 RX ORDER — OXYCODONE HYDROCHLORIDE 5 MG/1
5 TABLET ORAL PRN
Refills: 0 | Status: CANCELLED | OUTPATIENT
Start: 2025-03-27 | End: 2025-03-27

## 2025-03-27 RX ORDER — FENTANYL CITRATE 50 UG/ML
INJECTION, SOLUTION INTRAMUSCULAR; INTRAVENOUS
Status: DISCONTINUED | OUTPATIENT
Start: 2025-03-27 | End: 2025-03-27 | Stop reason: SDUPTHER

## 2025-03-27 RX ORDER — NALOXONE HYDROCHLORIDE 0.4 MG/ML
INJECTION, SOLUTION INTRAMUSCULAR; INTRAVENOUS; SUBCUTANEOUS PRN
Status: CANCELLED | OUTPATIENT
Start: 2025-03-27

## 2025-03-27 RX ORDER — ONDANSETRON 2 MG/ML
INJECTION INTRAMUSCULAR; INTRAVENOUS
Status: DISCONTINUED | OUTPATIENT
Start: 2025-03-27 | End: 2025-03-27 | Stop reason: SDUPTHER

## 2025-03-27 RX ORDER — OXYCODONE HYDROCHLORIDE 5 MG/1
5 TABLET ORAL EVERY 4 HOURS PRN
Qty: 8 TABLET | Refills: 0 | Status: SHIPPED | OUTPATIENT
Start: 2025-03-27 | End: 2025-03-30

## 2025-03-27 RX ORDER — SODIUM CHLORIDE 9 MG/ML
INJECTION, SOLUTION INTRAVENOUS CONTINUOUS
Status: DISCONTINUED | OUTPATIENT
Start: 2025-03-27 | End: 2025-03-27 | Stop reason: HOSPADM

## 2025-03-27 RX ORDER — LIDOCAINE HYDROCHLORIDE 20 MG/ML
INJECTION, SOLUTION INTRAVENOUS
Status: DISCONTINUED | OUTPATIENT
Start: 2025-03-27 | End: 2025-03-27 | Stop reason: SDUPTHER

## 2025-03-27 RX ORDER — FENTANYL CITRATE 50 UG/ML
50 INJECTION, SOLUTION INTRAMUSCULAR; INTRAVENOUS EVERY 5 MIN PRN
Refills: 0 | Status: CANCELLED | OUTPATIENT
Start: 2025-03-27

## 2025-03-27 RX ORDER — OXYCODONE HYDROCHLORIDE 5 MG/1
10 TABLET ORAL PRN
Refills: 0 | Status: CANCELLED | OUTPATIENT
Start: 2025-03-27 | End: 2025-03-27

## 2025-03-27 RX ADMIN — ONDANSETRON HYDROCHLORIDE 4 MG: 2 INJECTION INTRAMUSCULAR; INTRAVENOUS at 14:15

## 2025-03-27 RX ADMIN — SODIUM CHLORIDE: 0.9 INJECTION, SOLUTION INTRAVENOUS at 12:38

## 2025-03-27 RX ADMIN — Medication 2000 MG: at 13:54

## 2025-03-27 RX ADMIN — PROPOFOL 50 MG: 10 INJECTION, EMULSION INTRAVENOUS at 13:56

## 2025-03-27 RX ADMIN — FENTANYL CITRATE 50 MCG: 50 INJECTION INTRAMUSCULAR; INTRAVENOUS at 14:15

## 2025-03-27 RX ADMIN — PROPOFOL 50 MCG/KG/MIN: 10 INJECTION, EMULSION INTRAVENOUS at 13:59

## 2025-03-27 RX ADMIN — FENTANYL CITRATE 50 MCG: 50 INJECTION INTRAMUSCULAR; INTRAVENOUS at 13:52

## 2025-03-27 RX ADMIN — LIDOCAINE HYDROCHLORIDE 80 MG: 20 INJECTION, SOLUTION INTRAVENOUS at 13:56

## 2025-03-27 RX ADMIN — PROPOFOL 30 MG: 10 INJECTION, EMULSION INTRAVENOUS at 13:58

## 2025-03-27 ASSESSMENT — LIFESTYLE VARIABLES: SMOKING_STATUS: 0

## 2025-03-27 ASSESSMENT — PAIN SCALES - GENERAL: PAINLEVEL_OUTOF10: 0

## 2025-03-27 NOTE — INTERVAL H&P NOTE
Update History & Physical    The patient's History and Physical of March 27, 2025 was reviewed with the patient and I examined the patient. There was no change. The surgical site was confirmed by the patient and me.     Plan: The risks, benefits, expected outcome, and alternative to the recommended procedure have been discussed with the patient. Patient understands and wants to proceed with the procedure.     Electronically signed by BALBIR COCHRAN MD on 3/27/2025 at 12:05 PM

## 2025-03-27 NOTE — DISCHARGE INSTRUCTIONS
Maintain pressure dressing x 48 hours (remove gauze/tegaderm before 1st shower).  Keep incisions dry X 48 hours.     DISCHARGE SUMMARY from Nurse    PATIENT INSTRUCTIONS:    After general anesthesia or intravenous sedation, for 24 hours or while taking prescription Narcotics:  Limit your activities  Do not drive and operate hazardous machinery  Do not make important personal or business decisions  Do  not drink alcoholic beverages  If you have not urinated within 8 hours after discharge, please contact your surgeon on call.    Report the following to your surgeon:  Excessive pain, swelling, redness or odor of or around the surgical area  Temperature over 100.5  Nausea and vomiting lasting longer than 4 hours or if unable to take medications  Any signs of decreased circulation or nerve impairment to extremity: change in color, persistent  numbness, tingling, coldness or increase pain  Any questions    What to do at Home:  Recommended activity: ambulate in house and no lifting, Driving, or Strenuous exercise until advised    *  Please give a list of your current medications to your Primary Care Provider.    *  Please update this list whenever your medications are discontinued, doses are      changed, or new medications (including over-the-counter products) are added.    *  Please carry medication information at all times in case of emergency situations.    These are general instructions for a healthy lifestyle:    No smoking/ No tobacco products/ Avoid exposure to second hand smoke  Surgeon General's Warning:  Quitting smoking now greatly reduces serious risk to your health.    Obesity, smoking, and sedentary lifestyle greatly increases your risk for illness    A healthy diet, regular physical exercise & weight monitoring are important for maintaining a healthy lifestyle    You may be retaining fluid if you have a history of heart failure or if you experience any of the following symptoms:  Weight gain of 3 pounds or  more overnight or 5 pounds in a week, increased swelling in our hands or feet or shortness of breath while lying flat in bed.  Please call your doctor as soon as you notice any of these symptoms; do not wait until your next office visit.        The discharge information has been reviewed with the patient and caregiver.  The patient and caregiver verbalized understanding.  Discharge medications reviewed with the patient and caregiver and appropriate educational materials and side effects teaching were provided.  ___________________________________________________________________________________________________________________________________

## 2025-03-27 NOTE — ANESTHESIA POSTPROCEDURE EVALUATION
Department of Anesthesiology  Postprocedure Note    Patient: Erika Ross  MRN: [id number]  YOB: 1945  Date of evaluation: 3/27/2025    Procedure Summary       Date: 03/27/25 Room / Location: [facility] MAIN 03 / [facility] MAIN OR    Anesthesia Start: 1349 Anesthesia Stop: 1436    Procedure: EXCISION OF RIGHT FLANK SKIN, SUBCUTANEOUS MASS, LAYERED CLOSURE (Right: Flank) Diagnosis:       Skin lesion      Sebaceous cyst      (Skin lesion [L98.9])      (Sebaceous cyst [L72.3])    Surgeons: Bob Mojica MD Responsible Provider: Eddie Sanchez DO    Anesthesia Type: MAC ASA Status: 3            Anesthesia Type: MAC    Khalida Phase I:      Khalida Phase II:      Anesthesia Post Evaluation    Level of consciousness: awake  Airway patency: patent  Nausea & Vomiting: no nausea and no vomiting  Cardiovascular status: blood pressure returned to baseline  Respiratory status: acceptable and room air  Hydration status: euvolemic  Pain management: adequate    No notable events documented.

## 2025-03-27 NOTE — ANESTHESIA PRE PROCEDURE
Department of Anesthesiology  Preprocedure Note       Name:  Erika Ross   Age:  79 y.o.  :  1945                                          MRN:  [id number]         Date:  3/27/2025      Surgeon: Surgeon(s):  Bob Mojica MD    Procedure: Procedure(s):  EXCISION OF RIGHT FLANK SKIN, SUBCUTANEOUS MASS, LAYERED CLOSURE    Medications prior to admission:   Prior to Admission medications    Medication Sig Start Date End Date Taking? Authorizing Provider   levothyroxine (SYNTHROID) 50 MCG tablet Take 1 tablet by mouth every morning Indications: hypothyroid   Yes Gloria Lawson MD   amoxicillin (AMOXIL) 500 MG capsule Take 1 capsule by mouth 2 times daily Indications: hx back infection   Yes Gloria Lawson MD   dorzolamide (TRUSOPT) 2 % ophthalmic solution Place 2 drops into the right eye 3 times daily Indications: glaucoma   Yes Gloria Lawson MD   Omega 3 1000 MG CAPS Take by mouth daily   Yes Glroia Lawson MD   Turmeric 400 MG CAPS Take by mouth every morning   Yes Gloria Lawson MD   vitamin D (CHOLECALCIFEROL) 25 MCG (1000 UT) TABS tablet Take 1 tablet by mouth daily   Yes Gloria Lawson MD   brimonidine (ALPHAGAN P) 0.15 % ophthalmic solution Place 1 drop into the right eye 3 times daily Indications: glaucoma   Yes Gloria Lawson MD   amLODIPine (NORVASC) 10 MG tablet Take 1 tablet by mouth every morning Indications: HTN   Yes Automatic Reconciliation, Ar   coenzyme Q10 100 MG CAPS capsule Take 2 capsules by mouth daily   Yes Automatic Reconciliation, Ar   Latanoprostene Bunod (VYZULTA) 0.024 % SOLN Apply 1 drop to eye nightly Indications: glaucoma   Yes Automatic Reconciliation, Ar   losartan (COZAAR) 100 MG tablet Take 1 tablet by mouth every evening Indications: HTN   Yes Automatic Reconciliation, Ar   meclizine (ANTIVERT) 25 MG tablet Take 1 tablet by mouth as needed Indications: vertigo   Yes Automatic Reconciliation, Ar   hydroCHLOROthiazide

## 2025-03-27 NOTE — BRIEF OP NOTE
Brief Postoperative Note      Patient: Erika Ross  YOB: 1945  MRN: [id number]    Date of Procedure: 3/27/2025    Pre-Op Diagnosis Codes:      * Skin lesion [L98.9]     * Sebaceous cyst [L72.3]    Post-Op Diagnosis: Same       Procedure(s):  EXCISION OF RIGHT FLANK SKIN/SUBCUTANEOUS MASS, LAYERED CLOSURE    Surgeon(s):  Balbir Cochran MD    Assistant:  Surgical Assistant: Rachael Baird    Anesthesia: Monitor Anesthesia Care    Estimated Blood Loss (mL): Minimal    Complications: None    Specimens:   ID Type Source Tests Collected by Time Destination   A : right flank skin subcutaneous mass Tissue Flank SURGICAL PATHOLOGY PostBalbir MD 3/27/2025 1412        Implants:  * No implants in log *      Drains: * No LDAs found *    Findings:  Infection Present At Time Of Surgery (PATOS) (choose all levels that have infection present):  No infection present  Other Findings:    This procedure was not performed to treat primary cutaneous melanoma through wide local excision    Electronically signed by BALBIR COCHRAN MD on 3/27/2025 at 2:39 PM    Op note dictated #[id number]  RP

## 2025-03-28 NOTE — OP NOTE
[address]  [address]                            OPERATIVE REPORT      PATIENT NAME: REYNA YU                 : 1945  MED REC NO: [id number]                       ROOM: OR  ACCOUNT NO: [id number]                       ADMIT DATE: 2025  PROVIDER: Bob Mojica MD    DATE OF SERVICE:  2025    PREOPERATIVE DIAGNOSES:  Right flank skin/subcutaneous lesion.    POSTOPERATIVE DIAGNOSES:  Right flank skin/subcutaneous lesion.    PROCEDURES PERFORMED:       1. Wide local excision of right flank skin/subcutaneous lesion.     2. Skin/subcutaneous flap mobilization, advancement, multilayered closure.    SURGEON:  Bob Mojica MD    ASSISTANT:  None.    ANESTHESIA:  MAC/sedation with local anesthesia.    ESTIMATED BLOOD LOSS:  Minimal.    SPECIMENS REMOVED:  Right flank skin/subcutaneous lesion for permanent pathology.    INTRAOPERATIVE FINDINGS:  ***     COMPLICATIONS:  None.    IMPLANTS:  None.    INDICATIONS:  ***    DESCRIPTION OF PROCEDURE:  The patient is an 79-year-old black female referred to me from primary care for a subcutaneous lesion on the right flank adherent to the overlying skin, likely representing some kind of low-level infectious process such as a follicular or multifocal sebaceous cyst.  In the office, there was no discretely drainable abscess.  No active drainage at that time.  She had been keeping it in check with the topical skin cleansing/saltwater cleansing. Because it was enlarging and increasingly frequent in terms of being symptomatic, she did wish to proceed with excision.  I discussed with her the nature of surgery, alternatives, benefits, and risks.  She gave consent to proceed.  She was taken to surgery on 2025, met and identified in the preop holding area, and the surgical site was marked.  She was brought into the OR.  Preoperative intravenous antibiotics were given per protocol.

## (undated) DEVICE — DRAPE,CHEST,FENES,15X10,STERIL: Brand: MEDLINE

## (undated) DEVICE — SET ADMIN 16ML TBNG L100IN 2 Y INJ SITE IV PIGGY BK DISP

## (undated) DEVICE — SYRINGE MED 10ML LUERLOCK TIP W/O SFTY DISP

## (undated) DEVICE — SUTURE MONOCRYL + SZ 4-0 L27IN ABSRB UD L19MM PS-2 3/8 CIR MCP426H

## (undated) DEVICE — SUTURE VCRL SZ 3-0 L27IN ABSRB UD L26MM SH 1/2 CIR J416H

## (undated) DEVICE — MAJ-1414 SINGLE USE ADPATER BIOPSY VALV: Brand: SINGLE USE ADAPTOR BIOPSY VALVE

## (undated) DEVICE — GARMENT,MEDLINE,DVT,INT,CALF,MED, GEN2: Brand: MEDLINE

## (undated) DEVICE — SUT SLK 3-0 30IN SH BLK --

## (undated) DEVICE — STERILE POLYISOPRENE POWDER-FREE SURGICAL GLOVES WITH EMOLLIENT COATING: Brand: PROTEXIS

## (undated) DEVICE — SOLUTION IV 500ML 0.9% SOD CHL FLX CONT

## (undated) DEVICE — NDL PRT INJ NSAF BLNT 18GX1.5 --

## (undated) DEVICE — SYR 5ML 1/5 GRAD LL NSAF LF --

## (undated) DEVICE — SINGLE PORT MANIFOLD: Brand: NEPTUNE 2

## (undated) DEVICE — KENDALL RADIOLUCENT FOAM MONITORING ELECTRODE RECTANGULAR SHAPE: Brand: KENDALL

## (undated) DEVICE — CATH IV SAFE STR 22GX1IN BLU -- PROTECTIV PLUS

## (undated) DEVICE — CATH SUC CTRL PRT TRIFLO 14FR --

## (undated) DEVICE — SHEAR HARMONIC FOCUS OEM 9CM --

## (undated) DEVICE — TRAP SPEC COLL POLYP POLYSTYR --

## (undated) DEVICE — SUT VCRL + 2-0 27IN CT2 UD -- 36/BX

## (undated) DEVICE — MOUTHPIECE ENDOSCP 20X27MM --

## (undated) DEVICE — LIQUIBAND RAPID ADHESIVE 36/CS 0.8ML: Brand: MEDLINE

## (undated) DEVICE — WRISTBAND ID AD W2.5XL9.5CM RED VYN ADH CLSR UNI-PRINT

## (undated) DEVICE — HEAD DONUT FOAM POSITIONER: Brand: CARDINAL HEALTH

## (undated) DEVICE — ENDO CARRY-ON PROCEDURE KIT INCLUDES ENZYMATIC SPONGE, GAUZE, BIOHAZARD LABEL, TRAY, LUBRICANT, DIRTY SCOPE LABEL, WATER LABEL, TRAY, DRAWSTRING PAD, AND DEFENDO 4-PIECE KIT.: Brand: ENDO CARRY-ON PROCEDURE KIT

## (undated) DEVICE — GLOVE ORANGE PI 7 1/2   MSG9075

## (undated) DEVICE — SNARE POLYP SM W13MMXL240CM SHTH DIA2.4MM OVL FLX DISP

## (undated) DEVICE — MINOR: Brand: MEDLINE INDUSTRIES, INC.

## (undated) DEVICE — DRAIN SURG 19FR 0.25IN SIL RND W/ TRCR INDIC DOT RADPQ FULL

## (undated) DEVICE — SUT SLK 2-0SH 30IN BLK --

## (undated) DEVICE — CANNULA CUSH AD W/ 14FT TBG

## (undated) DEVICE — GAUZE,SPONGE,FLUFF,6"X6.75",STRL,10/TRAY: Brand: MEDLINE

## (undated) DEVICE — SPONGE GZ W4XL4IN COT 12 PLY TYP VII WVN C FLD DSGN

## (undated) DEVICE — REM POLYHESIVE ADULT PATIENT RETURN ELECTRODE: Brand: VALLEYLAB

## (undated) DEVICE — SOLUTION IRRIG 500ML 0.9% SOD CHLO USP POUR PLAS BTL

## (undated) DEVICE — TUBING, SUCTION, 1/4" X 12', STRAIGHT: Brand: MEDLINE

## (undated) DEVICE — NDL FLTR TIP 5 MIC 18GX1.5IN --

## (undated) DEVICE — SYR 3ML LL TIP 1/10ML GRAD --

## (undated) DEVICE — SUT VCRL + 2-0 36IN CT1 UD --

## (undated) DEVICE — SHEET,DRAPE,40X58,STERILE: Brand: MEDLINE

## (undated) DEVICE — FORCEPS BX CAP 240CM L RAD JAW 4

## (undated) DEVICE — HEX-LOCKING BLADE ELECTRODE: Brand: EDGE

## (undated) DEVICE — SPONGE GZ W4XL4IN COT 12 PLY TYP VII WVN C FLD DSGN STERILE

## (undated) DEVICE — SYRINGE 50ML E/T

## (undated) DEVICE — Device

## (undated) DEVICE — GAUZE SPNG AVANT 4X4 4PLY NS --

## (undated) DEVICE — TOURNIQUET PHLEB W1XL18IN BLU FLAT RL AND BND REUSE FOR IV

## (undated) DEVICE — SUT ETHLN 3-0 18IN PS2 BLK --

## (undated) DEVICE — SUTURE MCRYL SZ 4-0 L18IN ABSRB UD L19MM PS-2 3/8 CIR PRIM Y496G

## (undated) DEVICE — GLOVE SURG SZ 8 L12IN THK75MIL DK GRN LTX FREE

## (undated) DEVICE — SUTURE VICRYL + SZ 3-0 L27IN ABSRB UD L26MM SH 1/2 CIR VCP416H

## (undated) DEVICE — BRA SURG POST-OP LG 42IN --

## (undated) DEVICE — DERMABOND SKIN ADH 0.7ML --

## (undated) DEVICE — MEDI-VAC NON-CONDUCTIVE SUCTION TUBING: Brand: CARDINAL HEALTH

## (undated) DEVICE — STERILE POLYISOPRENE POWDER-FREE SURGICAL GLOVES: Brand: PROTEXIS

## (undated) DEVICE — TOWEL,OR,DSP,ST,BLUE,STD,4/PK,20PK/CS: Brand: MEDLINE

## (undated) DEVICE — CONTAINER,SPECIMEN,OR STERILE,4OZ: Brand: MEDLINE

## (undated) DEVICE — RESERVOIR,SUCTION,100CC,SILICONE: Brand: MEDLINE